# Patient Record
Sex: MALE | Race: WHITE | NOT HISPANIC OR LATINO | Employment: OTHER | URBAN - METROPOLITAN AREA
[De-identification: names, ages, dates, MRNs, and addresses within clinical notes are randomized per-mention and may not be internally consistent; named-entity substitution may affect disease eponyms.]

---

## 2024-09-23 ENCOUNTER — APPOINTMENT (OUTPATIENT)
Dept: RADIOLOGY | Facility: HOSPITAL | Age: 69
End: 2024-09-23
Payer: MEDICARE

## 2024-09-23 ENCOUNTER — ANESTHESIA EVENT (OUTPATIENT)
Dept: OPERATING ROOM | Facility: HOSPITAL | Age: 69
End: 2024-09-23
Payer: MEDICARE

## 2024-09-23 ENCOUNTER — APPOINTMENT (OUTPATIENT)
Dept: CARDIOLOGY | Facility: HOSPITAL | Age: 69
End: 2024-09-23
Payer: MEDICARE

## 2024-09-23 ENCOUNTER — ANESTHESIA (OUTPATIENT)
Dept: OPERATING ROOM | Facility: HOSPITAL | Age: 69
End: 2024-09-23
Payer: MEDICARE

## 2024-09-23 ENCOUNTER — HOSPITAL ENCOUNTER (INPATIENT)
Facility: HOSPITAL | Age: 69
LOS: 5 days | Discharge: NURSING, REHAB OR LONG-TERM CARE | End: 2024-09-28
Attending: EMERGENCY MEDICINE | Admitting: STUDENT IN AN ORGANIZED HEALTH CARE EDUCATION/TRAINING PROGRAM
Payer: MEDICARE

## 2024-09-23 DIAGNOSIS — Z79.4 CONTROLLED TYPE 2 DIABETES MELLITUS WITHOUT COMPLICATION, WITH LONG-TERM CURRENT USE OF INSULIN (MULTI): ICD-10-CM

## 2024-09-23 DIAGNOSIS — S82.301A CLOSED FRACTURE OF DISTAL END OF RIGHT TIBIA, UNSPECIFIED FRACTURE MORPHOLOGY, INITIAL ENCOUNTER: Primary | ICD-10-CM

## 2024-09-23 DIAGNOSIS — Z79.4 TYPE 2 DIABETES MELLITUS WITHOUT COMPLICATION, WITH LONG-TERM CURRENT USE OF INSULIN (MULTI): ICD-10-CM

## 2024-09-23 DIAGNOSIS — R73.9 HYPERGLYCEMIA: ICD-10-CM

## 2024-09-23 DIAGNOSIS — E11.9 CONTROLLED TYPE 2 DIABETES MELLITUS WITHOUT COMPLICATION, WITH LONG-TERM CURRENT USE OF INSULIN (MULTI): ICD-10-CM

## 2024-09-23 DIAGNOSIS — E11.9 TYPE 2 DIABETES MELLITUS WITHOUT COMPLICATION, WITH LONG-TERM CURRENT USE OF INSULIN (MULTI): ICD-10-CM

## 2024-09-23 DIAGNOSIS — E87.5 HYPERKALEMIA: ICD-10-CM

## 2024-09-23 LAB
ALBUMIN SERPL BCP-MCNC: 3.6 G/DL (ref 3.4–5)
ALP SERPL-CCNC: 105 U/L (ref 33–136)
ALT SERPL W P-5'-P-CCNC: 71 U/L (ref 10–52)
ANION GAP SERPL CALC-SCNC: 11 MMOL/L (ref 10–20)
AST SERPL W P-5'-P-CCNC: 38 U/L (ref 9–39)
BASOPHILS # BLD AUTO: 0.07 X10*3/UL (ref 0–0.1)
BASOPHILS NFR BLD AUTO: 1.1 %
BILIRUB SERPL-MCNC: 0.7 MG/DL (ref 0–1.2)
BUN SERPL-MCNC: 11 MG/DL (ref 6–23)
CALCIUM SERPL-MCNC: 9 MG/DL (ref 8.6–10.3)
CARDIAC TROPONIN I PNL SERPL HS: 5 NG/L (ref 0–20)
CHLORIDE SERPL-SCNC: 101 MMOL/L (ref 98–107)
CO2 SERPL-SCNC: 28 MMOL/L (ref 21–32)
CREAT SERPL-MCNC: 1.88 MG/DL (ref 0.5–1.3)
EGFRCR SERPLBLD CKD-EPI 2021: 39 ML/MIN/1.73M*2
EOSINOPHIL # BLD AUTO: 0.18 X10*3/UL (ref 0–0.7)
EOSINOPHIL NFR BLD AUTO: 2.8 %
ERYTHROCYTE [DISTWIDTH] IN BLOOD BY AUTOMATED COUNT: 12.2 % (ref 11.5–14.5)
GLUCOSE BLD MANUAL STRIP-MCNC: 237 MG/DL (ref 74–99)
GLUCOSE BLD MANUAL STRIP-MCNC: 237 MG/DL (ref 74–99)
GLUCOSE BLD MANUAL STRIP-MCNC: 242 MG/DL (ref 74–99)
GLUCOSE BLD MANUAL STRIP-MCNC: 247 MG/DL (ref 74–99)
GLUCOSE SERPL-MCNC: 458 MG/DL (ref 74–99)
HCT VFR BLD AUTO: 37.6 % (ref 41–52)
HGB BLD-MCNC: 12.7 G/DL (ref 13.5–17.5)
IMM GRANULOCYTES # BLD AUTO: 0.02 X10*3/UL (ref 0–0.7)
IMM GRANULOCYTES NFR BLD AUTO: 0.3 % (ref 0–0.9)
INR PPP: 1.1 (ref 0.9–1.1)
LYMPHOCYTES # BLD AUTO: 1.58 X10*3/UL (ref 1.2–4.8)
LYMPHOCYTES NFR BLD AUTO: 24.2 %
MAGNESIUM SERPL-MCNC: 1.81 MG/DL (ref 1.6–2.4)
MCH RBC QN AUTO: 30.5 PG (ref 26–34)
MCHC RBC AUTO-ENTMCNC: 33.8 G/DL (ref 32–36)
MCV RBC AUTO: 90 FL (ref 80–100)
MONOCYTES # BLD AUTO: 0.42 X10*3/UL (ref 0.1–1)
MONOCYTES NFR BLD AUTO: 6.4 %
NEUTROPHILS # BLD AUTO: 4.26 X10*3/UL (ref 1.2–7.7)
NEUTROPHILS NFR BLD AUTO: 65.2 %
NRBC BLD-RTO: 0 /100 WBCS (ref 0–0)
PLATELET # BLD AUTO: 136 X10*3/UL (ref 150–450)
POTASSIUM SERPL-SCNC: 4.6 MMOL/L (ref 3.5–5.3)
POTASSIUM SERPL-SCNC: 5.7 MMOL/L (ref 3.5–5.3)
PROT SERPL-MCNC: 6.3 G/DL (ref 6.4–8.2)
PROTHROMBIN TIME: 12.1 SECONDS (ref 9.8–12.8)
RBC # BLD AUTO: 4.16 X10*6/UL (ref 4.5–5.9)
SODIUM SERPL-SCNC: 134 MMOL/L (ref 136–145)
WBC # BLD AUTO: 6.5 X10*3/UL (ref 4.4–11.3)

## 2024-09-23 PROCEDURE — 99221 1ST HOSP IP/OBS SF/LOW 40: CPT | Performed by: ORTHOPAEDIC SURGERY

## 2024-09-23 PROCEDURE — 2500000001 HC RX 250 WO HCPCS SELF ADMINISTERED DRUGS (ALT 637 FOR MEDICARE OP): Performed by: REGISTERED NURSE

## 2024-09-23 PROCEDURE — 96375 TX/PRO/DX INJ NEW DRUG ADDON: CPT

## 2024-09-23 PROCEDURE — 80053 COMPREHEN METABOLIC PANEL: CPT | Performed by: NURSE PRACTITIONER

## 2024-09-23 PROCEDURE — 73600 X-RAY EXAM OF ANKLE: CPT | Mod: RIGHT SIDE | Performed by: RADIOLOGY

## 2024-09-23 PROCEDURE — 2500000002 HC RX 250 W HCPCS SELF ADMINISTERED DRUGS (ALT 637 FOR MEDICARE OP, ALT 636 FOR OP/ED): Performed by: REGISTERED NURSE

## 2024-09-23 PROCEDURE — 99222 1ST HOSP IP/OBS MODERATE 55: CPT | Performed by: REGISTERED NURSE

## 2024-09-23 PROCEDURE — 2500000004 HC RX 250 GENERAL PHARMACY W/ HCPCS (ALT 636 FOR OP/ED): Performed by: REGISTERED NURSE

## 2024-09-23 PROCEDURE — 2500000004 HC RX 250 GENERAL PHARMACY W/ HCPCS (ALT 636 FOR OP/ED): Performed by: NURSE PRACTITIONER

## 2024-09-23 PROCEDURE — 96361 HYDRATE IV INFUSION ADD-ON: CPT

## 2024-09-23 PROCEDURE — 73700 CT LOWER EXTREMITY W/O DYE: CPT | Mod: RIGHT SIDE | Performed by: RADIOLOGY

## 2024-09-23 PROCEDURE — 85025 COMPLETE CBC W/AUTO DIFF WBC: CPT | Performed by: NURSE PRACTITIONER

## 2024-09-23 PROCEDURE — 96374 THER/PROPH/DIAG INJ IV PUSH: CPT

## 2024-09-23 PROCEDURE — 84132 ASSAY OF SERUM POTASSIUM: CPT | Performed by: REGISTERED NURSE

## 2024-09-23 PROCEDURE — 73590 X-RAY EXAM OF LOWER LEG: CPT | Mod: RIGHT SIDE | Performed by: RADIOLOGY

## 2024-09-23 PROCEDURE — 73700 CT LOWER EXTREMITY W/O DYE: CPT | Mod: RT

## 2024-09-23 PROCEDURE — 73600 X-RAY EXAM OF ANKLE: CPT | Mod: RT

## 2024-09-23 PROCEDURE — 73590 X-RAY EXAM OF LOWER LEG: CPT | Mod: RT

## 2024-09-23 PROCEDURE — 85610 PROTHROMBIN TIME: CPT | Performed by: NURSE PRACTITIONER

## 2024-09-23 PROCEDURE — 2500000001 HC RX 250 WO HCPCS SELF ADMINISTERED DRUGS (ALT 637 FOR MEDICARE OP): Performed by: NURSE PRACTITIONER

## 2024-09-23 PROCEDURE — 93005 ELECTROCARDIOGRAM TRACING: CPT

## 2024-09-23 PROCEDURE — 99285 EMERGENCY DEPT VISIT HI MDM: CPT

## 2024-09-23 PROCEDURE — 83735 ASSAY OF MAGNESIUM: CPT | Performed by: NURSE PRACTITIONER

## 2024-09-23 PROCEDURE — 2500000002 HC RX 250 W HCPCS SELF ADMINISTERED DRUGS (ALT 637 FOR MEDICARE OP, ALT 636 FOR OP/ED): Performed by: NURSE PRACTITIONER

## 2024-09-23 PROCEDURE — 71045 X-RAY EXAM CHEST 1 VIEW: CPT

## 2024-09-23 PROCEDURE — 36415 COLL VENOUS BLD VENIPUNCTURE: CPT | Performed by: REGISTERED NURSE

## 2024-09-23 PROCEDURE — 84484 ASSAY OF TROPONIN QUANT: CPT | Performed by: NURSE PRACTITIONER

## 2024-09-23 PROCEDURE — 1200000002 HC GENERAL ROOM WITH TELEMETRY DAILY

## 2024-09-23 PROCEDURE — 36415 COLL VENOUS BLD VENIPUNCTURE: CPT | Performed by: NURSE PRACTITIONER

## 2024-09-23 PROCEDURE — 82947 ASSAY GLUCOSE BLOOD QUANT: CPT

## 2024-09-23 RX ORDER — MORPHINE SULFATE 4 MG/ML
4 INJECTION, SOLUTION INTRAMUSCULAR; INTRAVENOUS ONCE
Status: COMPLETED | OUTPATIENT
Start: 2024-09-23 | End: 2024-09-23

## 2024-09-23 RX ORDER — FLUTICASONE FUROATE AND VILANTEROL TRIFENATATE 200; 25 UG/1; UG/1
1 POWDER RESPIRATORY (INHALATION) DAILY
COMMUNITY
Start: 2024-05-30

## 2024-09-23 RX ORDER — INSULIN LISPRO 100 [IU]/ML
0-10 INJECTION, SOLUTION INTRAVENOUS; SUBCUTANEOUS
Status: DISCONTINUED | OUTPATIENT
Start: 2024-09-23 | End: 2024-09-28 | Stop reason: HOSPADM

## 2024-09-23 RX ORDER — ACETAMINOPHEN 160 MG/5ML
650 SOLUTION ORAL EVERY 4 HOURS PRN
Status: DISCONTINUED | OUTPATIENT
Start: 2024-09-23 | End: 2024-09-28 | Stop reason: HOSPADM

## 2024-09-23 RX ORDER — ZOLPIDEM TARTRATE 5 MG/1
10 TABLET ORAL NIGHTLY
Status: DISCONTINUED | OUTPATIENT
Start: 2024-09-23 | End: 2024-09-28 | Stop reason: HOSPADM

## 2024-09-23 RX ORDER — SODIUM BICARBONATE 650 MG/1
650 TABLET ORAL 2 TIMES DAILY
COMMUNITY
Start: 2024-05-01

## 2024-09-23 RX ORDER — ZOLPIDEM TARTRATE 12.5 MG/1
12.5 TABLET, FILM COATED, EXTENDED RELEASE ORAL NIGHTLY PRN
COMMUNITY
End: 2024-09-28 | Stop reason: HOSPADM

## 2024-09-23 RX ORDER — ATORVASTATIN CALCIUM 40 MG/1
40 TABLET, FILM COATED ORAL
COMMUNITY
Start: 2023-10-30

## 2024-09-23 RX ORDER — CYCLOBENZAPRINE HCL 5 MG
1 TABLET ORAL NIGHTLY PRN
COMMUNITY
Start: 2024-05-30 | End: 2024-09-28 | Stop reason: HOSPADM

## 2024-09-23 RX ORDER — MONTELUKAST SODIUM 10 MG/1
10 TABLET ORAL NIGHTLY
COMMUNITY

## 2024-09-23 RX ORDER — NALOXONE HYDROCHLORIDE 1 MG/ML
0.2 INJECTION INTRAMUSCULAR; INTRAVENOUS; SUBCUTANEOUS EVERY 5 MIN PRN
Status: DISCONTINUED | OUTPATIENT
Start: 2024-09-23 | End: 2024-09-28 | Stop reason: HOSPADM

## 2024-09-23 RX ORDER — LISINOPRIL 10 MG/1
10 TABLET ORAL DAILY
COMMUNITY
Start: 2024-03-01

## 2024-09-23 RX ORDER — ACETAMINOPHEN 650 MG/1
650 SUPPOSITORY RECTAL EVERY 4 HOURS PRN
Status: DISCONTINUED | OUTPATIENT
Start: 2024-09-23 | End: 2024-09-28 | Stop reason: HOSPADM

## 2024-09-23 RX ORDER — ACETAMINOPHEN 325 MG/1
650 TABLET ORAL EVERY 4 HOURS PRN
Status: DISCONTINUED | OUTPATIENT
Start: 2024-09-23 | End: 2024-09-28 | Stop reason: HOSPADM

## 2024-09-23 RX ORDER — TAMSULOSIN HYDROCHLORIDE 0.4 MG/1
0.4 CAPSULE ORAL
COMMUNITY
Start: 2024-07-02

## 2024-09-23 RX ORDER — ONDANSETRON HYDROCHLORIDE 2 MG/ML
4 INJECTION, SOLUTION INTRAVENOUS EVERY 8 HOURS PRN
Status: DISCONTINUED | OUTPATIENT
Start: 2024-09-23 | End: 2024-09-28 | Stop reason: HOSPADM

## 2024-09-23 RX ORDER — SODIUM CHLORIDE 9 MG/ML
50 INJECTION, SOLUTION INTRAVENOUS CONTINUOUS
Status: ACTIVE | OUTPATIENT
Start: 2024-09-23 | End: 2024-09-24

## 2024-09-23 RX ORDER — FENTANYL CITRATE 50 UG/ML
50 INJECTION, SOLUTION INTRAMUSCULAR; INTRAVENOUS ONCE
Status: COMPLETED | OUTPATIENT
Start: 2024-09-23 | End: 2024-09-23

## 2024-09-23 RX ORDER — TALC
3 POWDER (GRAM) TOPICAL NIGHTLY PRN
Status: DISCONTINUED | OUTPATIENT
Start: 2024-09-23 | End: 2024-09-28 | Stop reason: HOSPADM

## 2024-09-23 RX ORDER — MIRTAZAPINE 15 MG/1
15 TABLET, FILM COATED ORAL NIGHTLY
COMMUNITY

## 2024-09-23 RX ORDER — BUPROPION HYDROCHLORIDE 150 MG/1
150 TABLET, EXTENDED RELEASE ORAL 2 TIMES DAILY
COMMUNITY

## 2024-09-23 RX ORDER — POLYETHYLENE GLYCOL 3350 17 G/17G
17 POWDER, FOR SOLUTION ORAL DAILY
Status: DISCONTINUED | OUTPATIENT
Start: 2024-09-23 | End: 2024-09-28 | Stop reason: HOSPADM

## 2024-09-23 RX ORDER — CARVEDILOL 6.25 MG/1
6.25 TABLET ORAL 2 TIMES DAILY
Status: DISCONTINUED | OUTPATIENT
Start: 2024-09-23 | End: 2024-09-28 | Stop reason: HOSPADM

## 2024-09-23 RX ORDER — ONDANSETRON 4 MG/1
4 TABLET, FILM COATED ORAL EVERY 8 HOURS PRN
Status: DISCONTINUED | OUTPATIENT
Start: 2024-09-23 | End: 2024-09-28 | Stop reason: HOSPADM

## 2024-09-23 RX ORDER — MORPHINE SULFATE 2 MG/ML
1 INJECTION, SOLUTION INTRAMUSCULAR; INTRAVENOUS EVERY 4 HOURS PRN
Status: DISCONTINUED | OUTPATIENT
Start: 2024-09-23 | End: 2024-09-25

## 2024-09-23 RX ORDER — GABAPENTIN 300 MG/1
300 CAPSULE ORAL 2 TIMES DAILY
COMMUNITY

## 2024-09-23 RX ORDER — CARVEDILOL 6.25 MG/1
6.25 TABLET ORAL 2 TIMES DAILY
COMMUNITY
Start: 2024-01-03

## 2024-09-23 RX ORDER — VIT C/E/ZN/COPPR/LUTEIN/ZEAXAN 250MG-90MG
1000 CAPSULE ORAL
COMMUNITY
Start: 2023-01-11

## 2024-09-23 RX ORDER — LISINOPRIL 10 MG/1
10 TABLET ORAL DAILY
Status: DISCONTINUED | OUTPATIENT
Start: 2024-09-23 | End: 2024-09-26

## 2024-09-23 RX ORDER — HEPARIN SODIUM 5000 [USP'U]/ML
5000 INJECTION, SOLUTION INTRAVENOUS; SUBCUTANEOUS ONCE
Status: DISCONTINUED | OUTPATIENT
Start: 2024-09-23 | End: 2024-09-23

## 2024-09-23 RX ORDER — HEPARIN SODIUM 5000 [USP'U]/ML
5000 INJECTION, SOLUTION INTRAVENOUS; SUBCUTANEOUS EVERY 8 HOURS
Status: DISCONTINUED | OUTPATIENT
Start: 2024-09-23 | End: 2024-09-23

## 2024-09-23 RX ORDER — ONDANSETRON HYDROCHLORIDE 2 MG/ML
4 INJECTION, SOLUTION INTRAVENOUS ONCE
Status: COMPLETED | OUTPATIENT
Start: 2024-09-23 | End: 2024-09-23

## 2024-09-23 RX ORDER — METFORMIN HYDROCHLORIDE 500 MG/1
500 TABLET ORAL 2 TIMES DAILY
COMMUNITY

## 2024-09-23 RX ORDER — HYDROCODONE BITARTRATE AND ACETAMINOPHEN 5; 325 MG/1; MG/1
1 TABLET ORAL EVERY 6 HOURS PRN
Status: DISCONTINUED | OUTPATIENT
Start: 2024-09-23 | End: 2024-09-28 | Stop reason: HOSPADM

## 2024-09-23 RX ORDER — ENOXAPARIN SODIUM 100 MG/ML
30 INJECTION SUBCUTANEOUS ONCE
Status: COMPLETED | OUTPATIENT
Start: 2024-09-23 | End: 2024-09-23

## 2024-09-23 RX ORDER — INSULIN ASPART INJECTION 100 [IU]/ML
4 INJECTION, SOLUTION SUBCUTANEOUS
Status: ON HOLD | COMMUNITY
Start: 2024-08-07 | End: 2024-09-26

## 2024-09-23 RX ORDER — CYANOCOBALAMIN 1000 UG/ML
1000 INJECTION, SOLUTION INTRAMUSCULAR; SUBCUTANEOUS
COMMUNITY
Start: 2024-06-10

## 2024-09-23 SDOH — SOCIAL STABILITY: SOCIAL INSECURITY: HAS ANYONE EVER THREATENED TO HURT YOUR FAMILY OR YOUR PETS?: NO

## 2024-09-23 SDOH — ECONOMIC STABILITY: TRANSPORTATION INSECURITY
IN THE PAST 12 MONTHS, HAS THE LACK OF TRANSPORTATION KEPT YOU FROM MEDICAL APPOINTMENTS OR FROM GETTING MEDICATIONS?: PATIENT DECLINED

## 2024-09-23 SDOH — ECONOMIC STABILITY: HOUSING INSECURITY: AT ANY TIME IN THE PAST 12 MONTHS, WERE YOU HOMELESS OR LIVING IN A SHELTER (INCLUDING NOW)?: PATIENT DECLINED

## 2024-09-23 SDOH — SOCIAL STABILITY: SOCIAL INSECURITY: ARE YOU OR HAVE YOU BEEN THREATENED OR ABUSED PHYSICALLY, EMOTIONALLY, OR SEXUALLY BY ANYONE?: NO

## 2024-09-23 SDOH — SOCIAL STABILITY: SOCIAL INSECURITY: DO YOU FEEL UNSAFE GOING BACK TO THE PLACE WHERE YOU ARE LIVING?: NO

## 2024-09-23 SDOH — SOCIAL STABILITY: SOCIAL INSECURITY: ABUSE: ADULT

## 2024-09-23 SDOH — SOCIAL STABILITY: SOCIAL INSECURITY: WERE YOU ABLE TO COMPLETE ALL THE BEHAVIORAL HEALTH SCREENINGS?: YES

## 2024-09-23 SDOH — SOCIAL STABILITY: SOCIAL INSECURITY: DOES ANYONE TRY TO KEEP YOU FROM HAVING/CONTACTING OTHER FRIENDS OR DOING THINGS OUTSIDE YOUR HOME?: NO

## 2024-09-23 SDOH — ECONOMIC STABILITY: INCOME INSECURITY: HOW HARD IS IT FOR YOU TO PAY FOR THE VERY BASICS LIKE FOOD, HOUSING, MEDICAL CARE, AND HEATING?: PATIENT DECLINED

## 2024-09-23 SDOH — ECONOMIC STABILITY: INCOME INSECURITY: IN THE LAST 12 MONTHS, WAS THERE A TIME WHEN YOU WERE NOT ABLE TO PAY THE MORTGAGE OR RENT ON TIME?: PATIENT DECLINED

## 2024-09-23 SDOH — SOCIAL STABILITY: SOCIAL INSECURITY: HAVE YOU HAD ANY THOUGHTS OF HARMING ANYONE ELSE?: NO

## 2024-09-23 SDOH — SOCIAL STABILITY: SOCIAL INSECURITY: HAVE YOU HAD THOUGHTS OF HARMING ANYONE ELSE?: NO

## 2024-09-23 SDOH — ECONOMIC STABILITY: HOUSING INSECURITY: IN THE PAST 12 MONTHS, HOW MANY TIMES HAVE YOU MOVED WHERE YOU WERE LIVING?: 0

## 2024-09-23 SDOH — SOCIAL STABILITY: SOCIAL INSECURITY: ARE THERE ANY APPARENT SIGNS OF INJURIES/BEHAVIORS THAT COULD BE RELATED TO ABUSE/NEGLECT?: NO

## 2024-09-23 SDOH — SOCIAL STABILITY: SOCIAL INSECURITY: DO YOU FEEL ANYONE HAS EXPLOITED OR TAKEN ADVANTAGE OF YOU FINANCIALLY OR OF YOUR PERSONAL PROPERTY?: NO

## 2024-09-23 SDOH — ECONOMIC STABILITY: TRANSPORTATION INSECURITY
IN THE PAST 12 MONTHS, HAS LACK OF TRANSPORTATION KEPT YOU FROM MEETINGS, WORK, OR FROM GETTING THINGS NEEDED FOR DAILY LIVING?: PATIENT DECLINED

## 2024-09-23 ASSESSMENT — COGNITIVE AND FUNCTIONAL STATUS - GENERAL
TURNING FROM BACK TO SIDE WHILE IN FLAT BAD: A LITTLE
PATIENT BASELINE BEDBOUND: NO
TOILETING: A LITTLE
CLIMB 3 TO 5 STEPS WITH RAILING: A LOT
MOVING TO AND FROM BED TO CHAIR: A LOT
MOBILITY SCORE: 14
MOVING TO AND FROM BED TO CHAIR: A LOT
PERSONAL GROOMING: A LITTLE
DRESSING REGULAR UPPER BODY CLOTHING: A LITTLE
TOILETING: A LOT
MOVING FROM LYING ON BACK TO SITTING ON SIDE OF FLAT BED WITH BEDRAILS: A LITTLE
HELP NEEDED FOR BATHING: A LITTLE
DAILY ACTIVITIY SCORE: 21
CLIMB 3 TO 5 STEPS WITH RAILING: A LOT
STANDING UP FROM CHAIR USING ARMS: A LOT
TURNING FROM BACK TO SIDE WHILE IN FLAT BAD: A LITTLE
STANDING UP FROM CHAIR USING ARMS: A LOT
HELP NEEDED FOR BATHING: A LITTLE
DRESSING REGULAR LOWER BODY CLOTHING: A LOT
WALKING IN HOSPITAL ROOM: A LOT
MOVING FROM LYING ON BACK TO SITTING ON SIDE OF FLAT BED WITH BEDRAILS: A LITTLE
WALKING IN HOSPITAL ROOM: A LOT
MOBILITY SCORE: 14
DRESSING REGULAR LOWER BODY CLOTHING: A LITTLE
DAILY ACTIVITIY SCORE: 17

## 2024-09-23 ASSESSMENT — CHA2DS2 SCORE
DIABETES: YES
CHA2D2S VASC SCORE: 3
AGE IN YEARS: 65-74
SEX: MALE
HYPERTENSION: YES
CHF OR LEFT VENTRICULAR DYSFUNCTION: NO
PRIOR STROKE OR TIA OR THROMBOEMBOLISM: NO
VASCULAR DISEASE: NO

## 2024-09-23 ASSESSMENT — PAIN - FUNCTIONAL ASSESSMENT
PAIN_FUNCTIONAL_ASSESSMENT: 0-10

## 2024-09-23 ASSESSMENT — PATIENT HEALTH QUESTIONNAIRE - PHQ9
1. LITTLE INTEREST OR PLEASURE IN DOING THINGS: NOT AT ALL
2. FEELING DOWN, DEPRESSED OR HOPELESS: NOT AT ALL
SUM OF ALL RESPONSES TO PHQ9 QUESTIONS 1 & 2: 0

## 2024-09-23 ASSESSMENT — ACTIVITIES OF DAILY LIVING (ADL)
DRESSING YOURSELF: INDEPENDENT
TOILETING: INDEPENDENT
WALKS IN HOME: INDEPENDENT
HEARING - RIGHT EAR: FUNCTIONAL
JUDGMENT_ADEQUATE_SAFELY_COMPLETE_DAILY_ACTIVITIES: YES
BATHING: INDEPENDENT
ADEQUATE_TO_COMPLETE_ADL: YES
GROOMING: INDEPENDENT
PATIENT'S MEMORY ADEQUATE TO SAFELY COMPLETE DAILY ACTIVITIES?: YES
HEARING - LEFT EAR: FUNCTIONAL
FEEDING YOURSELF: INDEPENDENT

## 2024-09-23 ASSESSMENT — PAIN SCALES - GENERAL
PAINLEVEL_OUTOF10: 8
PAINLEVEL_OUTOF10: 6
PAINLEVEL_OUTOF10: 9
PAINLEVEL_OUTOF10: 8
PAINLEVEL_OUTOF10: 7
PAINLEVEL_OUTOF10: 6
PAINLEVEL_OUTOF10: 8

## 2024-09-23 ASSESSMENT — COLUMBIA-SUICIDE SEVERITY RATING SCALE - C-SSRS
6. HAVE YOU EVER DONE ANYTHING, STARTED TO DO ANYTHING, OR PREPARED TO DO ANYTHING TO END YOUR LIFE?: NO
2. HAVE YOU ACTUALLY HAD ANY THOUGHTS OF KILLING YOURSELF?: NO
1. IN THE PAST MONTH, HAVE YOU WISHED YOU WERE DEAD OR WISHED YOU COULD GO TO SLEEP AND NOT WAKE UP?: NO

## 2024-09-23 ASSESSMENT — LIFESTYLE VARIABLES
AUDIT-C TOTAL SCORE: 0
TOTAL SCORE: 0
HOW MANY STANDARD DRINKS CONTAINING ALCOHOL DO YOU HAVE ON A TYPICAL DAY: PATIENT DOES NOT DRINK
HAVE PEOPLE ANNOYED YOU BY CRITICIZING YOUR DRINKING: NO
EVER FELT BAD OR GUILTY ABOUT YOUR DRINKING: NO
EVER HAD A DRINK FIRST THING IN THE MORNING TO STEADY YOUR NERVES TO GET RID OF A HANGOVER: NO
AUDIT-C TOTAL SCORE: 0
HOW OFTEN DO YOU HAVE 6 OR MORE DRINKS ON ONE OCCASION: NEVER
HAVE YOU EVER FELT YOU SHOULD CUT DOWN ON YOUR DRINKING: NO
SKIP TO QUESTIONS 9-10: 1
HOW OFTEN DO YOU HAVE A DRINK CONTAINING ALCOHOL: NEVER

## 2024-09-23 ASSESSMENT — PAIN DESCRIPTION - DESCRIPTORS: DESCRIPTORS: ACHING

## 2024-09-23 ASSESSMENT — PAIN DESCRIPTION - ORIENTATION: ORIENTATION: RIGHT

## 2024-09-23 ASSESSMENT — PAIN DESCRIPTION - LOCATION: LOCATION: ANKLE

## 2024-09-23 NOTE — H&P
History Of Present Illness  Jorge Barrera is a 68 y.o. male presenting with complaint of right ankle pain s/p mechanical fall.  Patient stated he was carrying water down his wheelchair ramp which was wet outside after rain fall, slipped, and could not catch himself.  He has a history of Parkinson's disease with bilateral lower extremity weakness.  He did not hit his head and he did not lose consciousness.  He is independent with all ADLs and uses a scooter in most instances.  He denies any significant cardiac history such as PCI, CABG.  PMH: Parkinson's disease, neuropathy, type 2 diabetes insulin-dependent, Hashimoto's, hep C, COPD, hypertension, hyperlipidemia, CKD stage IIIb, BPH, anxiety, dysphagia.    Patient denies any precipitating factors such as loss of consciousness, dizziness, blurry vision, headache, chest pain, shortness of breath, fever or chills prior to or after mechanical fall.  X-ray showed right fibula fracture and right tibia fracture.  Orthopedics recommends CT scan.    He does take a daily aspirin.  Admission labs show glucose 458, sodium 134, potassium 5.7, creatinine 1.88, EGFR 39, BUN 11, INR 1.1, WBC 6.5, hemoglobin 12.7, hematocrit 37.6, platelets 136.    Patient examined and seen. Alert and oriented x3, resting comfortably.  Patient denies chest pain, shortness of breath, palpitations, abdominal pain, fever or chills.  Right lower extremity is splinted with toes warm and pink, capillary refills < 2. Pt will be admitted for further intervention. Orthopedics consulted.     Review of systems: 10 system were reviewed and were negative except what was mentioned in history of present illness    RCRI indicator  II cardiac event calculated at this time with available imaging and history.  Telemetry monitoring recommended postop.  Patient in usual state of mental health, alert and oriented x4, discussed risks, benefits, alternative treatments with patient.  Patient verbalized understanding through  teach back method and is agreeable for surgical intervention.  Risks and benefits will also be discussed  with patient at bedside per orthopedic team. Patient is currently medically stable at this time, procedure is medically necessary despite comorbidites, patient is an acceptable risk for surgery MODERATE RISK.    Surgical Risk Assessment:  Updated Revised Cardiac Risk Index (RCRI):  High risk surgery: 0  Ischemic heart disease history: 0  CHF history: 0  TIA/CVA history: 0  Pre-operative treatement with insulin: 1  Pre-op creatinine>2.0: 0    1 point = Class II Risk/6.0% 30 day risk of both intra-/post-operative major adverse cardiovascular events including cardiac arrest, MI, arrhythmias, and/or even death.  Patient appears medically optimized.    Re-assess patient post-operatively for hypotension, aspiration, altered mental status, or any other change in status from baseline for possible escalation of monitoring in telemetry or ICU management.  Post-operative VTE prophylaxis and pain management per surgical service.    VTE prophylaxis: SCDs (resume chemoprophylaxis per surgery service)       Past Medical History  Neuropathy, Type 2 Diabetes insulin dependent, Hep C, COPD, HTN, HLD, BPH, CKD statge 3b, anxiety, dysphagia     Surgical History  He has no past surgical history on file.     Social History  Denies drug, alcohol or tobacco use     Family History  Reviewed, not pertinent to admission      Allergies  Patient has no known allergies.         Physical Exam   Constitutional: Well developed, awake/alert/oriented x3, , cooperative + hx of dysphagia  Eyes: PERRL, EOMI, clear sclera  ENMT: mucous membranes moist, no apparent injury, no lesions seen - no dentures in place   Head/Neck: Neck supple, no apparent injury, thyroid without mass or tenderness  Respiratory/Thorax: Patent airways,  normal breath sounds with good chest expansion, thorax symmetric  Cardiovascular: Regular, rate and rhythm, no murmurs, 2+  equal pulses of the extremities, normal S 1and S 2  Gastrointestinal: Nondistended, soft, non-tender,  +BS x 4 quadrants  Genitourinary: voiding freely without CVA tenderness or suprabupic tenderness   Musculoskeletal:  Right lower extremity in splint, toes are warm and pink, capillary refills < 2   Extremities: normal extremities,  no contusions or wounds seen  except as noted above   Skin: warm, dry, intact  Neurological: alert/oriented x 3, speech clear,   Psychiatric: appropriate mood and behavior    Last Recorded Vitals  /89   Pulse 75   Temp 35.9 °C (96.6 °F) (Temporal)   Resp 16   Wt 72.6 kg (160 lb)   SpO2 98%     Relevant Results  Scheduled medications  sodium chloride, 500 mL, intravenous, Once      Continuous medications     PRN medications      Results for orders placed or performed during the hospital encounter of 09/23/24 (from the past 24 hour(s))   ECG 12 lead   Result Value Ref Range    Ventricular Rate 69 BPM    Atrial Rate 69 BPM    AZ Interval 162 ms    QRS Duration 76 ms    QT Interval 398 ms    QTC Calculation(Bazett) 426 ms    P Axis 64 degrees    R Axis 79 degrees    T Axis 59 degrees    QRS Count 11 beats    Q Onset 225 ms    P Onset 144 ms    P Offset 207 ms    T Offset 424 ms    QTC Fredericia 417 ms   CBC and Auto Differential   Result Value Ref Range    WBC 6.5 4.4 - 11.3 x10*3/uL    nRBC 0.0 0.0 - 0.0 /100 WBCs    RBC 4.16 (L) 4.50 - 5.90 x10*6/uL    Hemoglobin 12.7 (L) 13.5 - 17.5 g/dL    Hematocrit 37.6 (L) 41.0 - 52.0 %    MCV 90 80 - 100 fL    MCH 30.5 26.0 - 34.0 pg    MCHC 33.8 32.0 - 36.0 g/dL    RDW 12.2 11.5 - 14.5 %    Platelets 136 (L) 150 - 450 x10*3/uL    Neutrophils % 65.2 40.0 - 80.0 %    Immature Granulocytes %, Automated 0.3 0.0 - 0.9 %    Lymphocytes % 24.2 13.0 - 44.0 %    Monocytes % 6.4 2.0 - 10.0 %    Eosinophils % 2.8 0.0 - 6.0 %    Basophils % 1.1 0.0 - 2.0 %    Neutrophils Absolute 4.26 1.20 - 7.70 x10*3/uL    Immature Granulocytes Absolute, Automated  0.02 0.00 - 0.70 x10*3/uL    Lymphocytes Absolute 1.58 1.20 - 4.80 x10*3/uL    Monocytes Absolute 0.42 0.10 - 1.00 x10*3/uL    Eosinophils Absolute 0.18 0.00 - 0.70 x10*3/uL    Basophils Absolute 0.07 0.00 - 0.10 x10*3/uL   Comprehensive metabolic panel   Result Value Ref Range    Glucose 458 (HH) 74 - 99 mg/dL    Sodium 134 (L) 136 - 145 mmol/L    Potassium 5.7 (H) 3.5 - 5.3 mmol/L    Chloride 101 98 - 107 mmol/L    Bicarbonate 28 21 - 32 mmol/L    Anion Gap 11 10 - 20 mmol/L    Urea Nitrogen 11 6 - 23 mg/dL    Creatinine 1.88 (H) 0.50 - 1.30 mg/dL    eGFR 39 (L) >60 mL/min/1.73m*2    Calcium 9.0 8.6 - 10.3 mg/dL    Albumin 3.6 3.4 - 5.0 g/dL    Alkaline Phosphatase 105 33 - 136 U/L    Total Protein 6.3 (L) 6.4 - 8.2 g/dL    AST 38 9 - 39 U/L    Bilirubin, Total 0.7 0.0 - 1.2 mg/dL    ALT 71 (H) 10 - 52 U/L   Magnesium   Result Value Ref Range    Magnesium 1.81 1.60 - 2.40 mg/dL   Protime-INR   Result Value Ref Range    Protime 12.1 9.8 - 12.8 seconds    INR 1.1 0.9 - 1.1   Troponin I, High Sensitivity   Result Value Ref Range    Troponin I, High Sensitivity 5 0 - 20 ng/L     Assessment/Plan   Assessment & Plan      # Oblique fracture of the distal shaft of the right tibia.   Displaced fracture of the proximal right fibula.     Admit to Hospital Team   Orthopedics  team Consulted for surgery in AM   DVTp and Pain management per Ortho   PT/OT evaluation  and treatment   CBC/BMP as appropriate, review results  Pulmonary Toileting   Follow up as needed outpatient with Orthopedics at discharge       # Diabetes Mellitus Type 2 Insulin Dependent   Continue home medications  Diet Cardiac/Diabetic  Continue Sliding Scale SSI AC/HS   A1C 608 from 8/2024  Encourage healthy lifestyle changes  Home meds not reconciled    # HTN/ HLD  Hemodynamically stable  Pending home med reconciliation     # BPH  Continue home mediations when verified    # CKD Stage 3b  Avoid nephrotoxic agents  EMR indicates baseline 1.95     #  Hyperkalemia  Repeat potassium 5.7 on admission       FULL CODE  Disposition: 48-72 hours   Possible Rehab Placement will be needed    Time spent  56 minutes obtaining labs, imaging, recommendations, interview, assessment, examination, medication review/ordering, and EMR review.    Plan of care was discussed extensively with patient, RN and Primary attending Dr. Celaya. Patient verbalized understanding through teach back method. All questions and concerns addressed upon examination.     Of note, this documentation is completed using the Dragon Dictation system (voice recognition software). There may be spelling and/or grammatical errors that were not corrected prior to final submission.       Mery Dyer, APRN-CNP

## 2024-09-23 NOTE — CONSULTS
"Reason For Consult  Distal tibial fracture and fibular fracture    History Of Present Illness  Jorge Barrera is a 68 y.o. male presenting with right leg pain.  The patient was walking down his wheelchair ramp when he slipped and fell, injuring his right leg and ankle.  He denies any hip or knee pain.  He denies any groin pain on the right.  He denies any upper extremity pain.     Past Medical History  He has a past medical history of Diabetes mellitus (Multi) and Hypertension.    Surgical History  He has no past surgical history on file.     Social History  He reports that he has quit smoking. His smoking use included cigarettes. He has never used smokeless tobacco. He reports that he does not currently use alcohol. He reports that he does not use drugs.    Family History  No family history on file.     Allergies  Patient has no known allergies.    Review of Systems  As per HPI     Physical Exam  This is an older male in no acute distress     Last Recorded Vitals  Blood pressure (!) 182/89, pulse 75, temperature 35.9 °C (96.6 °F), resp. rate 20, height 1.702 m (5' 7\"), weight 72.6 kg (160 lb), SpO2 96%.  Bilateral shoulder elbow and wrist motion was painless.  Neck is supple and nontender  Logrolling of the hips elicits no pain  Bilateral palpation about the knees elicits no pain.  Right knee motion was painless.  The right leg is in a splint.  The patient had no increased pain with passive motion of his toes.  He was able to move his toes.  Dorsalis pedis pulse is 2+ and palpable on the right.  Relevant Results  Results for orders placed or performed during the hospital encounter of 09/23/24 (from the past 24 hour(s))   ECG 12 lead   Result Value Ref Range    Ventricular Rate 69 BPM    Atrial Rate 69 BPM    AL Interval 162 ms    QRS Duration 76 ms    QT Interval 398 ms    QTC Calculation(Bazett) 426 ms    P Axis 64 degrees    R Axis 79 degrees    T Axis 59 degrees    QRS Count 11 beats    Q Onset 225 ms    P Onset " 144 ms    P Offset 207 ms    T Offset 424 ms    QTC Fredericia 417 ms   CBC and Auto Differential   Result Value Ref Range    WBC 6.5 4.4 - 11.3 x10*3/uL    nRBC 0.0 0.0 - 0.0 /100 WBCs    RBC 4.16 (L) 4.50 - 5.90 x10*6/uL    Hemoglobin 12.7 (L) 13.5 - 17.5 g/dL    Hematocrit 37.6 (L) 41.0 - 52.0 %    MCV 90 80 - 100 fL    MCH 30.5 26.0 - 34.0 pg    MCHC 33.8 32.0 - 36.0 g/dL    RDW 12.2 11.5 - 14.5 %    Platelets 136 (L) 150 - 450 x10*3/uL    Neutrophils % 65.2 40.0 - 80.0 %    Immature Granulocytes %, Automated 0.3 0.0 - 0.9 %    Lymphocytes % 24.2 13.0 - 44.0 %    Monocytes % 6.4 2.0 - 10.0 %    Eosinophils % 2.8 0.0 - 6.0 %    Basophils % 1.1 0.0 - 2.0 %    Neutrophils Absolute 4.26 1.20 - 7.70 x10*3/uL    Immature Granulocytes Absolute, Automated 0.02 0.00 - 0.70 x10*3/uL    Lymphocytes Absolute 1.58 1.20 - 4.80 x10*3/uL    Monocytes Absolute 0.42 0.10 - 1.00 x10*3/uL    Eosinophils Absolute 0.18 0.00 - 0.70 x10*3/uL    Basophils Absolute 0.07 0.00 - 0.10 x10*3/uL   Comprehensive metabolic panel   Result Value Ref Range    Glucose 458 (HH) 74 - 99 mg/dL    Sodium 134 (L) 136 - 145 mmol/L    Potassium 5.7 (H) 3.5 - 5.3 mmol/L    Chloride 101 98 - 107 mmol/L    Bicarbonate 28 21 - 32 mmol/L    Anion Gap 11 10 - 20 mmol/L    Urea Nitrogen 11 6 - 23 mg/dL    Creatinine 1.88 (H) 0.50 - 1.30 mg/dL    eGFR 39 (L) >60 mL/min/1.73m*2    Calcium 9.0 8.6 - 10.3 mg/dL    Albumin 3.6 3.4 - 5.0 g/dL    Alkaline Phosphatase 105 33 - 136 U/L    Total Protein 6.3 (L) 6.4 - 8.2 g/dL    AST 38 9 - 39 U/L    Bilirubin, Total 0.7 0.0 - 1.2 mg/dL    ALT 71 (H) 10 - 52 U/L   Magnesium   Result Value Ref Range    Magnesium 1.81 1.60 - 2.40 mg/dL   Protime-INR   Result Value Ref Range    Protime 12.1 9.8 - 12.8 seconds    INR 1.1 0.9 - 1.1   Troponin I, High Sensitivity   Result Value Ref Range    Troponin I, High Sensitivity 5 0 - 20 ng/L   POCT GLUCOSE   Result Value Ref Range    POCT Glucose 247 (H) 74 - 99 mg/dL   POCT GLUCOSE    Result Value Ref Range    POCT Glucose 237 (H) 74 - 99 mg/dL   Potassium   Result Value Ref Range    Potassium 4.6 3.5 - 5.3 mmol/L   POCT GLUCOSE   Result Value Ref Range    POCT Glucose 242 (H) 74 - 99 mg/dL   Imaging:  XR tibia fibula right 2 views  Status: Final result     PACS Images - [id number]     Show images for XR tibia fibula right 2 views  Signed by    Signed Time Phone Pager   Korey Daniels MD 9/23/2024 12:51 [phone]      Exam Information    Status Exam Begun Exam Ended   Final 9/23/2024 11:53 9/23/2024 11:54     Study Result    Narrative & Impression   STUDY:  Tibia and Fibula Radiographs; 9/23/2024 at 11:54 AM.  INDICATION:  Evaluate for remainder of the tibia.  COMPARISON:  XR right ankle 9/23/2024.  ACCESSION NUMBER(S):  [id number]  ORDERING CLINICIAN:  ANNI CALABRESE  TECHNIQUE:  Two view(s) of the right tibia and fibula.  FINDINGS:    There is an oblique fracture of the distal shaft of the right tibia.   There is a displaced fracture of the proximal right fibula.  Soft  tissue swelling is noted.  IMPRESSION:  1.  Oblique fracture of the distal shaft of the right tibia.  2.  Displaced fracture of the proximal right fibula.  Signed by Korey Daniels MD     Narrative & Impression   STUDY:  CT Extremity; Completed Time:  9/23/2024 12:10 PM  INDICATION:  Evaluate fracture for extension into the joint.  COMPARISON:  XR ankle 9/23/2024.  ACCESSION NUMBER(S):  [id number]  ORDERING CLINICIAN:  ANNI CALABRESE  TECHNIQUE:  Thin section axial images were obtained through the right  ankle without intravenous contrast.  Orthogonal reconstructed images  were obtained and reviewed.    Automated mA/kV exposure control was utilized and patient examination  was performed in strict accordance with principles of ALARA.  FINDINGS: Diffuse demineralization. The nondisplaced spiral fracture  of the distal tibia seen on radiographs is not fully included on  imaging. Very subtle spiral extension into the junction  between the  posterior and middle thirds of the tibial plafond laterally is seen,  slice 22 of series 206. Mild involvement of the posteromedial aspect  of the tibial plafond, slice 38 series 202. There is also involvement  of the anterolateral tibial plafond with minimal anterior displacement  slice 40 of series 202. Please also refer to slice 37 of series 204.   Also noted is fracture of the posterior lateral aspect of the talus  slice 42 of series 202 associated with subchondral lucency. This  fracture is closely adjacent to a partially detached os trigonum. Tiny  acute avulsion fracture of the anterior margin of the lateral  malleolus is suspected. There is also mild chronic deformity of the  lateral malleolus.  Mild osteoarthritis of the tibiotalar, subtalar, talonavicular and  calcaneocuboid joint midfoot and tarsometatarsal joints. Corticated  accessory navicular.  Tendons and ligaments are not targeted for full evaluation.  Constellation of abnormalities would be consistent with syndesmotic  ligament injury, noting proximal fibular fracture in keeping with  Maissoneuve  injury pattern. Mild medial flexor and peroneal tendinosis is  appreciated.  Additional lateral ligamentous injury is questioned as there is  possible tiny anterior lateral malleolar fracture. The anterior  extensor tendons appear grossly intact. Probable Achilles tendinosis  and central cord predominant plantar fasciitis. Sinus tarsi is intact.  Incidental partial fusion between the navicular and the cuboid perhaps  related to previous trauma. Please refer to sagittal slice 23.  Extensive atherosclerosis.  IMPRESSION:  Spiral distal tibial diaphyseal fracture does extend into the tibial  plafond, with a mildly comminuted pattern. There is dominant displaced  transverse component at the junction between the middle and posterior  thirds of the tibial plafond, laterally more conspicuously than  medially, and there is also extension through the  anterolateral aspect  of the tibial plafond. This pattern of injury would be associated with  syndesmotic injury, both anterior and posterior suspected, more severe  anteriorly.  Tiny avulsion fracture of the tip of the lateral malleolus with  suspected associated lateral ligamentous injury.  Nondisplaced fracture of the posterior lateral talar dome associated  with subchondral lucency.   Signed by Damari Trujillo MD       Scheduled medications  insulin lispro, 0-10 Units, subcutaneous, Before meals & nightly  polyethylene glycol, 17 g, oral, Daily      Continuous medications  sodium chloride 0.9%, 50 mL/hr, Last Rate: 50 mL/hr (09/23/24 1603)      PRN medications  PRN medications: acetaminophen **OR** acetaminophen **OR** acetaminophen, HYDROcodone-acetaminophen, melatonin, morphine, naloxone, ondansetron **OR** ondansetron  Results for orders placed or performed during the hospital encounter of 09/23/24 (from the past 24 hour(s))   ECG 12 lead   Result Value Ref Range    Ventricular Rate 69 BPM    Atrial Rate 69 BPM    OH Interval 162 ms    QRS Duration 76 ms    QT Interval 398 ms    QTC Calculation(Bazett) 426 ms    P Axis 64 degrees    R Axis 79 degrees    T Axis 59 degrees    QRS Count 11 beats    Q Onset 225 ms    P Onset 144 ms    P Offset 207 ms    T Offset 424 ms    QTC Fredericia 417 ms   CBC and Auto Differential   Result Value Ref Range    WBC 6.5 4.4 - 11.3 x10*3/uL    nRBC 0.0 0.0 - 0.0 /100 WBCs    RBC 4.16 (L) 4.50 - 5.90 x10*6/uL    Hemoglobin 12.7 (L) 13.5 - 17.5 g/dL    Hematocrit 37.6 (L) 41.0 - 52.0 %    MCV 90 80 - 100 fL    MCH 30.5 26.0 - 34.0 pg    MCHC 33.8 32.0 - 36.0 g/dL    RDW 12.2 11.5 - 14.5 %    Platelets 136 (L) 150 - 450 x10*3/uL    Neutrophils % 65.2 40.0 - 80.0 %    Immature Granulocytes %, Automated 0.3 0.0 - 0.9 %    Lymphocytes % 24.2 13.0 - 44.0 %    Monocytes % 6.4 2.0 - 10.0 %    Eosinophils % 2.8 0.0 - 6.0 %    Basophils % 1.1 0.0 - 2.0 %    Neutrophils Absolute 4.26 1.20 -  7.70 x10*3/uL    Immature Granulocytes Absolute, Automated 0.02 0.00 - 0.70 x10*3/uL    Lymphocytes Absolute 1.58 1.20 - 4.80 x10*3/uL    Monocytes Absolute 0.42 0.10 - 1.00 x10*3/uL    Eosinophils Absolute 0.18 0.00 - 0.70 x10*3/uL    Basophils Absolute 0.07 0.00 - 0.10 x10*3/uL   Comprehensive metabolic panel   Result Value Ref Range    Glucose 458 (HH) 74 - 99 mg/dL    Sodium 134 (L) 136 - 145 mmol/L    Potassium 5.7 (H) 3.5 - 5.3 mmol/L    Chloride 101 98 - 107 mmol/L    Bicarbonate 28 21 - 32 mmol/L    Anion Gap 11 10 - 20 mmol/L    Urea Nitrogen 11 6 - 23 mg/dL    Creatinine 1.88 (H) 0.50 - 1.30 mg/dL    eGFR 39 (L) >60 mL/min/1.73m*2    Calcium 9.0 8.6 - 10.3 mg/dL    Albumin 3.6 3.4 - 5.0 g/dL    Alkaline Phosphatase 105 33 - 136 U/L    Total Protein 6.3 (L) 6.4 - 8.2 g/dL    AST 38 9 - 39 U/L    Bilirubin, Total 0.7 0.0 - 1.2 mg/dL    ALT 71 (H) 10 - 52 U/L   Magnesium   Result Value Ref Range    Magnesium 1.81 1.60 - 2.40 mg/dL   Protime-INR   Result Value Ref Range    Protime 12.1 9.8 - 12.8 seconds    INR 1.1 0.9 - 1.1   Troponin I, High Sensitivity   Result Value Ref Range    Troponin I, High Sensitivity 5 0 - 20 ng/L   POCT GLUCOSE   Result Value Ref Range    POCT Glucose 247 (H) 74 - 99 mg/dL   POCT GLUCOSE   Result Value Ref Range    POCT Glucose 237 (H) 74 - 99 mg/dL   Potassium   Result Value Ref Range    Potassium 4.6 3.5 - 5.3 mmol/L   POCT GLUCOSE   Result Value Ref Range    POCT Glucose 242 (H) 74 - 99 mg/dL        Assessment/Plan     Right distal tibial shaft fracture, per my interpretation of the CT scan I do not believe that the plafond and is involved.  Fracture should be amenable to an intramedullary nail.  Right fibular fracture    Plan intramedullary nailing of the right tibia pending medical clearance.  The procedure was explained to the patient  Questions answered      Sukhjinder Caberra MD

## 2024-09-23 NOTE — ED PROVIDER NOTES
HPI   Chief Complaint   Patient presents with    Ankle Pain       68-year-old male presents urgency department, states he was carrying a jug of water earlier this morning, the fan was on, think he might of gotten soft wrapped up in the Fan, describes falling injuring his right ankle.  Denies any additional injuries, denies head injury or loss of consciousness, neck or back pain.  Patient is on any blood thinners.  Describes isolated pain to the right ankle, no pain to the knee, hip.      History provided by:  Patient   used: No            Patient History   No past medical history on file.  No past surgical history on file.  No family history on file.  Social History     Tobacco Use    Smoking status: Not on file    Smokeless tobacco: Not on file   Substance Use Topics    Alcohol use: Not on file    Drug use: Not on file       Physical Exam   ED Triage Vitals [09/23/24 1014]   Temperature Heart Rate Respirations BP   35.9 °C (96.6 °F) 84 16 (!) 182/97      Pulse Ox Temp Source Heart Rate Source Patient Position   97 % Temporal -- --      BP Location FiO2 (%)     -- --       Physical Exam  Gen.: Vitals noted. No distress. Afebrile.   Neck: Supple. No adenopathy.   Cardiac: Regular rate rhythm. No murmur.   Pulmonary: Equal breath sounds bilaterally. No adventitious breath sounds.   Abdomen: Soft, nontender, nonsurgical. Normoactive bowel sounds.   Back: Nontender throughout.   Lower extremity: There is tenderness both to the lateral proximal ankle with some superficial abrasions presents  The foot is nontender. Is neurovascularly intact distally. The remainder of the extremity is nontender, specifically, nontender over the knee and fibular head.       ED Course & MDM   Diagnoses as of 09/23/24 1252   Closed fracture of distal end of right tibia, unspecified fracture morphology, initial encounter   Hyperglycemia   Hyperkalemia     Labs Reviewed   CBC WITH AUTO DIFFERENTIAL - Abnormal       Result  Value    WBC 6.5      nRBC 0.0      RBC 4.16 (*)     Hemoglobin 12.7 (*)     Hematocrit 37.6 (*)     MCV 90      MCH 30.5      MCHC 33.8      RDW 12.2      Platelets 136 (*)     Neutrophils % 65.2      Immature Granulocytes %, Automated 0.3      Lymphocytes % 24.2      Monocytes % 6.4      Eosinophils % 2.8      Basophils % 1.1      Neutrophils Absolute 4.26      Immature Granulocytes Absolute, Automated 0.02      Lymphocytes Absolute 1.58      Monocytes Absolute 0.42      Eosinophils Absolute 0.18      Basophils Absolute 0.07     COMPREHENSIVE METABOLIC PANEL - Abnormal    Glucose 458 (*)     Sodium 134 (*)     Potassium 5.7 (*)     Chloride 101      Bicarbonate 28      Anion Gap 11      Urea Nitrogen 11      Creatinine 1.88 (*)     eGFR 39 (*)     Calcium 9.0      Albumin 3.6      Alkaline Phosphatase 105      Total Protein 6.3 (*)     AST 38      Bilirubin, Total 0.7      ALT 71 (*)    MAGNESIUM - Normal    Magnesium 1.81     PROTIME-INR - Normal    Protime 12.1      INR 1.1     TROPONIN I, HIGH SENSITIVITY - Normal    Troponin I, High Sensitivity 5      Narrative:     Less than 99th percentile of normal range cutoff-  Female and children under 18 years old <14 ng/L; Male <21 ng/L: Negative  Repeat testing should be performed if clinically indicated.     Female and children under 18 years old 14-50 ng/L; Male 21-50 ng/L:  Consistent with possible cardiac damage and possible increased clinical   risk. Serial measurements may help to assess extent of myocardial damage.     >50 ng/L: Consistent with cardiac damage, increased clinical risk and  myocardial infarction. Serial measurements may help assess extent of   myocardial damage.      NOTE: Children less than 1 year old may have higher baseline troponin   levels and results should be interpreted in conjunction with the overall   clinical context.     NOTE: Troponin I testing is performed using a different   testing methodology at [facility] than at  other   system hospitals. Direct result comparisons should only   be made within the same method.   POCT GLUCOSE METER        XR chest 1 view   Final Result   1.  No acute findings on single AP view of the chest.   Signed by Korey Daniels MD      XR tibia fibula right 2 views   Final Result   1.  Oblique fracture of the distal shaft of the right tibia.   2.  Displaced fracture of the proximal right fibula.   Signed by Korey Daniels MD      XR ankle right 2 views   Final Result   Acute spiral fracture of the distal one third right tibia with medial   displacement of the distal component by a half shaft width.   Signed by Attila De León MD      CT ankle right wo IV contrast    (Results Pending)                 No data recorded     [place] Coma Scale Score: 15 (09/23/24 1015 : Abdelrahman Anderson RN)                     Medical Decision Making  Patient presents in a vacuum splint, I did give him 50 mcg of fentanyl and x-ray imagings were obtained, concerning for distal fibula fracture, spiral fracture.    Discussed the x-ray results with Dr. Macias, she recommended additional imaging which was ordered.  Labs were ordered as well as patient will require surgical intervention.    Given his comorbidities she recommended hospitalist admission.    CBC unremarkable, although metabolic panel is concerning, patient has a glucose of 458, sodium 134, potassium 5.7 with creatinine of 1.88.  Given a total of 2 L of IV fluid and 5 units of insulin which should help both with these glucose and to the hyperkalemia.    EKG at 1127 with ventricular of 69, as interpreted by me, shows normal sinus rhythm with normal axis normal interval, unremarkable ST and T wave patterns, no evidence of acute ischemia or other acute findings.    Placed in a posterior lower leg splint by the bedside nurse, MSPs intact post splint application.  Patient additionally given morphine as well for pain.  Plan of care discussed with the patient.            Shared KELTON  Attestation:    This patient was seen by the advanced practice provider.  I personally saw the patient and made/approved the management plan and take responsibility for the patient management.    History: 68-year-old male presents with right ankle pain after fall.    Exam: Regular rate and rhythm cardiac exam with clear breath sounds bilaterally.  Abdomen is soft and nontender.  Neurological exam is grossly intact.  There is tenderness to palpation to the right ankle with no obvious deformities.  The limb is neurovascularly intact.    MDM: Fracture, dislocation, sprain, contusion    I have seen and examined the patient, agree with the workup, evaluation, medical decision making, management and diagnosis.  The care plan has been discussed.    Srinivas Alfaro MD      Procedure  Procedures     Rashida Swift, CHRISTIANA-CNP  09/23/24 8428

## 2024-09-24 ENCOUNTER — ANESTHESIA (OUTPATIENT)
Dept: OPERATING ROOM | Facility: HOSPITAL | Age: 69
End: 2024-09-24
Payer: MEDICARE

## 2024-09-24 ENCOUNTER — APPOINTMENT (OUTPATIENT)
Dept: RADIOLOGY | Facility: HOSPITAL | Age: 69
End: 2024-09-24
Payer: MEDICARE

## 2024-09-24 PROBLEM — N18.9 CHRONIC RENAL INSUFFICIENCY: Status: ACTIVE | Noted: 2024-09-24

## 2024-09-24 PROBLEM — I10 HTN (HYPERTENSION): Status: ACTIVE | Noted: 2024-09-24

## 2024-09-24 PROBLEM — J44.9 CHRONIC OBSTRUCTIVE PULMONARY DISEASE (MULTI): Status: ACTIVE | Noted: 2024-09-24

## 2024-09-24 PROBLEM — K21.9 GASTROESOPHAGEAL REFLUX DISEASE: Status: ACTIVE | Noted: 2024-09-24

## 2024-09-24 PROBLEM — G47.33 OSA (OBSTRUCTIVE SLEEP APNEA): Status: ACTIVE | Noted: 2024-09-24

## 2024-09-24 PROBLEM — E11.9 DIABETES MELLITUS, TYPE 2 (MULTI): Status: ACTIVE | Noted: 2024-09-24

## 2024-09-24 PROBLEM — E78.5 HYPERLIPIDEMIA: Status: ACTIVE | Noted: 2024-09-24

## 2024-09-24 LAB
ANION GAP SERPL CALC-SCNC: 10 MMOL/L (ref 10–20)
BUN SERPL-MCNC: 12 MG/DL (ref 6–23)
CALCIUM SERPL-MCNC: 8.9 MG/DL (ref 8.6–10.3)
CHLORIDE SERPL-SCNC: 105 MMOL/L (ref 98–107)
CO2 SERPL-SCNC: 25 MMOL/L (ref 21–32)
CREAT SERPL-MCNC: 1.75 MG/DL (ref 0.5–1.3)
EGFRCR SERPLBLD CKD-EPI 2021: 42 ML/MIN/1.73M*2
ERYTHROCYTE [DISTWIDTH] IN BLOOD BY AUTOMATED COUNT: 12.4 % (ref 11.5–14.5)
EST. AVERAGE GLUCOSE BLD GHB EST-MCNC: 194 MG/DL
GLUCOSE BLD MANUAL STRIP-MCNC: 145 MG/DL (ref 74–99)
GLUCOSE BLD MANUAL STRIP-MCNC: 191 MG/DL (ref 74–99)
GLUCOSE BLD MANUAL STRIP-MCNC: 230 MG/DL (ref 74–99)
GLUCOSE BLD MANUAL STRIP-MCNC: 377 MG/DL (ref 74–99)
GLUCOSE SERPL-MCNC: 199 MG/DL (ref 74–99)
HBA1C MFR BLD: 8.4 %
HCT VFR BLD AUTO: 36.7 % (ref 41–52)
HGB BLD-MCNC: 12.2 G/DL (ref 13.5–17.5)
MAGNESIUM SERPL-MCNC: 1.7 MG/DL (ref 1.6–2.4)
MCH RBC QN AUTO: 30.7 PG (ref 26–34)
MCHC RBC AUTO-ENTMCNC: 33.2 G/DL (ref 32–36)
MCV RBC AUTO: 92 FL (ref 80–100)
NRBC BLD-RTO: 0 /100 WBCS (ref 0–0)
PLATELET # BLD AUTO: 158 X10*3/UL (ref 150–450)
POTASSIUM SERPL-SCNC: 5.1 MMOL/L (ref 3.5–5.3)
RBC # BLD AUTO: 3.98 X10*6/UL (ref 4.5–5.9)
SODIUM SERPL-SCNC: 135 MMOL/L (ref 136–145)
WBC # BLD AUTO: 6.8 X10*3/UL (ref 4.4–11.3)

## 2024-09-24 PROCEDURE — 7100000001 HC RECOVERY ROOM TIME - INITIAL BASE CHARGE: Performed by: ORTHOPAEDIC SURGERY

## 2024-09-24 PROCEDURE — 2720000007 HC OR 272 NO HCPCS: Performed by: ORTHOPAEDIC SURGERY

## 2024-09-24 PROCEDURE — 2500000001 HC RX 250 WO HCPCS SELF ADMINISTERED DRUGS (ALT 637 FOR MEDICARE OP)

## 2024-09-24 PROCEDURE — 3700000002 HC GENERAL ANESTHESIA TIME - EACH INCREMENTAL 1 MINUTE: Performed by: ORTHOPAEDIC SURGERY

## 2024-09-24 PROCEDURE — 2500000004 HC RX 250 GENERAL PHARMACY W/ HCPCS (ALT 636 FOR OP/ED): Performed by: NURSE ANESTHETIST, CERTIFIED REGISTERED

## 2024-09-24 PROCEDURE — 27759 TREATMENT OF TIBIA FRACTURE: CPT | Performed by: PHYSICIAN ASSISTANT

## 2024-09-24 PROCEDURE — 82947 ASSAY GLUCOSE BLOOD QUANT: CPT

## 2024-09-24 PROCEDURE — 36415 COLL VENOUS BLD VENIPUNCTURE: CPT | Performed by: REGISTERED NURSE

## 2024-09-24 PROCEDURE — 2500000001 HC RX 250 WO HCPCS SELF ADMINISTERED DRUGS (ALT 637 FOR MEDICARE OP): Performed by: NURSE PRACTITIONER

## 2024-09-24 PROCEDURE — 2500000002 HC RX 250 W HCPCS SELF ADMINISTERED DRUGS (ALT 637 FOR MEDICARE OP, ALT 636 FOR OP/ED): Performed by: REGISTERED NURSE

## 2024-09-24 PROCEDURE — 2500000005 HC RX 250 GENERAL PHARMACY W/O HCPCS: Performed by: NURSE ANESTHETIST, CERTIFIED REGISTERED

## 2024-09-24 PROCEDURE — 82374 ASSAY BLOOD CARBON DIOXIDE: CPT | Performed by: REGISTERED NURSE

## 2024-09-24 PROCEDURE — 2500000005 HC RX 250 GENERAL PHARMACY W/O HCPCS: Performed by: ORTHOPAEDIC SURGERY

## 2024-09-24 PROCEDURE — 83735 ASSAY OF MAGNESIUM: CPT | Performed by: REGISTERED NURSE

## 2024-09-24 PROCEDURE — 99232 SBSQ HOSP IP/OBS MODERATE 35: CPT | Performed by: REGISTERED NURSE

## 2024-09-24 PROCEDURE — 3600000004 HC OR TIME - INITIAL BASE CHARGE - PROCEDURE LEVEL FOUR: Performed by: ORTHOPAEDIC SURGERY

## 2024-09-24 PROCEDURE — 2500000004 HC RX 250 GENERAL PHARMACY W/ HCPCS (ALT 636 FOR OP/ED): Performed by: REGISTERED NURSE

## 2024-09-24 PROCEDURE — 2500000004 HC RX 250 GENERAL PHARMACY W/ HCPCS (ALT 636 FOR OP/ED): Performed by: STUDENT IN AN ORGANIZED HEALTH CARE EDUCATION/TRAINING PROGRAM

## 2024-09-24 PROCEDURE — 2500000004 HC RX 250 GENERAL PHARMACY W/ HCPCS (ALT 636 FOR OP/ED)

## 2024-09-24 PROCEDURE — 27759 TREATMENT OF TIBIA FRACTURE: CPT | Performed by: ORTHOPAEDIC SURGERY

## 2024-09-24 PROCEDURE — 2500000002 HC RX 250 W HCPCS SELF ADMINISTERED DRUGS (ALT 637 FOR MEDICARE OP, ALT 636 FOR OP/ED)

## 2024-09-24 PROCEDURE — 7100000002 HC RECOVERY ROOM TIME - EACH INCREMENTAL 1 MINUTE: Performed by: ORTHOPAEDIC SURGERY

## 2024-09-24 PROCEDURE — 83036 HEMOGLOBIN GLYCOSYLATED A1C: CPT | Mod: ELYLAB | Performed by: REGISTERED NURSE

## 2024-09-24 PROCEDURE — 3600000009 HC OR TIME - EACH INCREMENTAL 1 MINUTE - PROCEDURE LEVEL FOUR: Performed by: ORTHOPAEDIC SURGERY

## 2024-09-24 PROCEDURE — 3700000001 HC GENERAL ANESTHESIA TIME - INITIAL BASE CHARGE: Performed by: ORTHOPAEDIC SURGERY

## 2024-09-24 PROCEDURE — 1100000001 HC PRIVATE ROOM DAILY

## 2024-09-24 PROCEDURE — 85027 COMPLETE CBC AUTOMATED: CPT | Performed by: REGISTERED NURSE

## 2024-09-24 PROCEDURE — C1769 GUIDE WIRE: HCPCS | Performed by: ORTHOPAEDIC SURGERY

## 2024-09-24 PROCEDURE — 2500000004 HC RX 250 GENERAL PHARMACY W/ HCPCS (ALT 636 FOR OP/ED): Mod: JZ | Performed by: NURSE PRACTITIONER

## 2024-09-24 PROCEDURE — 2780000003 HC OR 278 NO HCPCS: Performed by: ORTHOPAEDIC SURGERY

## 2024-09-24 PROCEDURE — 2500000005 HC RX 250 GENERAL PHARMACY W/O HCPCS: Performed by: STUDENT IN AN ORGANIZED HEALTH CARE EDUCATION/TRAINING PROGRAM

## 2024-09-24 PROCEDURE — 0QSG04Z REPOSITION RIGHT TIBIA WITH INTERNAL FIXATION DEVICE, OPEN APPROACH: ICD-10-PCS | Performed by: ORTHOPAEDIC SURGERY

## 2024-09-24 DEVICE — IMPLANTABLE DEVICE: Type: IMPLANTABLE DEVICE | Site: LEG | Status: FUNCTIONAL

## 2024-09-24 RX ORDER — ENOXAPARIN SODIUM 100 MG/ML
40 INJECTION SUBCUTANEOUS DAILY
Status: CANCELLED | OUTPATIENT
Start: 2024-09-25

## 2024-09-24 RX ORDER — CEFAZOLIN SODIUM 2 G/50ML
2 SOLUTION INTRAVENOUS EVERY 8 HOURS
Status: DISCONTINUED | OUTPATIENT
Start: 2024-09-24 | End: 2024-09-24

## 2024-09-24 RX ORDER — SODIUM CHLORIDE, SODIUM LACTATE, POTASSIUM CHLORIDE, CALCIUM CHLORIDE 600; 310; 30; 20 MG/100ML; MG/100ML; MG/100ML; MG/100ML
100 INJECTION, SOLUTION INTRAVENOUS CONTINUOUS
Status: DISCONTINUED | OUTPATIENT
Start: 2024-09-24 | End: 2024-09-24

## 2024-09-24 RX ORDER — CHOLECALCIFEROL (VITAMIN D3) 25 MCG
1000 TABLET ORAL
Status: DISCONTINUED | OUTPATIENT
Start: 2024-09-24 | End: 2024-09-28 | Stop reason: HOSPADM

## 2024-09-24 RX ORDER — SODIUM CHLORIDE 0.9 G/100ML
IRRIGANT IRRIGATION AS NEEDED
Status: DISCONTINUED | OUTPATIENT
Start: 2024-09-24 | End: 2024-09-24 | Stop reason: HOSPADM

## 2024-09-24 RX ORDER — METFORMIN HYDROCHLORIDE 500 MG/1
500 TABLET ORAL
Status: DISCONTINUED | OUTPATIENT
Start: 2024-09-24 | End: 2024-09-26

## 2024-09-24 RX ORDER — SODIUM BICARBONATE 650 MG/1
650 TABLET ORAL 2 TIMES DAILY
Status: DISCONTINUED | OUTPATIENT
Start: 2024-09-24 | End: 2024-09-28 | Stop reason: HOSPADM

## 2024-09-24 RX ORDER — PHENYLEPHRINE HCL IN 0.9% NACL 1 MG/10 ML
SYRINGE (ML) INTRAVENOUS AS NEEDED
Status: DISCONTINUED | OUTPATIENT
Start: 2024-09-24 | End: 2024-09-24

## 2024-09-24 RX ORDER — LABETALOL HYDROCHLORIDE 5 MG/ML
5 INJECTION, SOLUTION INTRAVENOUS ONCE AS NEEDED
Status: DISCONTINUED | OUTPATIENT
Start: 2024-09-24 | End: 2024-09-24 | Stop reason: HOSPADM

## 2024-09-24 RX ORDER — ROCURONIUM BROMIDE 10 MG/ML
INJECTION, SOLUTION INTRAVENOUS AS NEEDED
Status: DISCONTINUED | OUTPATIENT
Start: 2024-09-24 | End: 2024-09-24

## 2024-09-24 RX ORDER — MIRTAZAPINE 15 MG/1
15 TABLET, FILM COATED ORAL NIGHTLY
Status: DISCONTINUED | OUTPATIENT
Start: 2024-09-24 | End: 2024-09-28 | Stop reason: HOSPADM

## 2024-09-24 RX ORDER — PROPOFOL 10 MG/ML
INJECTION, EMULSION INTRAVENOUS AS NEEDED
Status: DISCONTINUED | OUTPATIENT
Start: 2024-09-24 | End: 2024-09-24

## 2024-09-24 RX ORDER — GABAPENTIN 300 MG/1
300 CAPSULE ORAL 2 TIMES DAILY
Status: DISCONTINUED | OUTPATIENT
Start: 2024-09-24 | End: 2024-09-28 | Stop reason: HOSPADM

## 2024-09-24 RX ORDER — MIDAZOLAM HYDROCHLORIDE 1 MG/ML
INJECTION, SOLUTION INTRAMUSCULAR; INTRAVENOUS AS NEEDED
Status: DISCONTINUED | OUTPATIENT
Start: 2024-09-24 | End: 2024-09-24

## 2024-09-24 RX ORDER — MONTELUKAST SODIUM 10 MG/1
10 TABLET ORAL NIGHTLY
Status: DISCONTINUED | OUTPATIENT
Start: 2024-09-24 | End: 2024-09-28 | Stop reason: HOSPADM

## 2024-09-24 RX ORDER — ONDANSETRON HYDROCHLORIDE 2 MG/ML
INJECTION, SOLUTION INTRAVENOUS AS NEEDED
Status: DISCONTINUED | OUTPATIENT
Start: 2024-09-24 | End: 2024-09-24

## 2024-09-24 RX ORDER — BUPROPION HYDROCHLORIDE 150 MG/1
150 TABLET, EXTENDED RELEASE ORAL 2 TIMES DAILY
Status: DISCONTINUED | OUTPATIENT
Start: 2024-09-24 | End: 2024-09-28 | Stop reason: HOSPADM

## 2024-09-24 RX ORDER — FLUTICASONE FUROATE AND VILANTEROL 200; 25 UG/1; UG/1
1 POWDER RESPIRATORY (INHALATION) DAILY
Status: DISCONTINUED | OUTPATIENT
Start: 2024-09-24 | End: 2024-09-28 | Stop reason: HOSPADM

## 2024-09-24 RX ORDER — FENTANYL CITRATE 50 UG/ML
50 INJECTION, SOLUTION INTRAMUSCULAR; INTRAVENOUS EVERY 5 MIN PRN
Status: DISCONTINUED | OUTPATIENT
Start: 2024-09-24 | End: 2024-09-24 | Stop reason: HOSPADM

## 2024-09-24 RX ORDER — DROPERIDOL 2.5 MG/ML
0.62 INJECTION, SOLUTION INTRAMUSCULAR; INTRAVENOUS ONCE AS NEEDED
Status: DISCONTINUED | OUTPATIENT
Start: 2024-09-24 | End: 2024-09-24 | Stop reason: HOSPADM

## 2024-09-24 RX ORDER — DIPHENHYDRAMINE HYDROCHLORIDE 50 MG/ML
12.5 INJECTION INTRAMUSCULAR; INTRAVENOUS ONCE AS NEEDED
Status: DISCONTINUED | OUTPATIENT
Start: 2024-09-24 | End: 2024-09-24 | Stop reason: HOSPADM

## 2024-09-24 RX ORDER — CEFAZOLIN SODIUM 2 G/100ML
2 INJECTION, SOLUTION INTRAVENOUS EVERY 8 HOURS
Status: COMPLETED | OUTPATIENT
Start: 2024-09-24 | End: 2024-09-25

## 2024-09-24 RX ORDER — SODIUM CHLORIDE 9 MG/ML
100 INJECTION, SOLUTION INTRAVENOUS CONTINUOUS
Status: DISCONTINUED | OUTPATIENT
Start: 2024-09-24 | End: 2024-09-28

## 2024-09-24 RX ORDER — LIDOCAINE HYDROCHLORIDE 10 MG/ML
0.1 INJECTION, SOLUTION EPIDURAL; INFILTRATION; INTRACAUDAL; PERINEURAL ONCE
Status: DISCONTINUED | OUTPATIENT
Start: 2024-09-24 | End: 2024-09-24 | Stop reason: HOSPADM

## 2024-09-24 RX ORDER — FENTANYL CITRATE 50 UG/ML
INJECTION, SOLUTION INTRAMUSCULAR; INTRAVENOUS AS NEEDED
Status: DISCONTINUED | OUTPATIENT
Start: 2024-09-24 | End: 2024-09-24

## 2024-09-24 RX ORDER — CEFAZOLIN SODIUM 2 G/100ML
2 INJECTION, SOLUTION INTRAVENOUS ONCE
Status: COMPLETED | OUTPATIENT
Start: 2024-09-24 | End: 2024-09-24

## 2024-09-24 RX ORDER — LIDOCAINE HYDROCHLORIDE 20 MG/ML
INJECTION, SOLUTION INFILTRATION; PERINEURAL AS NEEDED
Status: DISCONTINUED | OUTPATIENT
Start: 2024-09-24 | End: 2024-09-24

## 2024-09-24 RX ORDER — ATORVASTATIN CALCIUM 20 MG/1
40 TABLET, FILM COATED ORAL NIGHTLY
Status: DISCONTINUED | OUTPATIENT
Start: 2024-09-24 | End: 2024-09-28 | Stop reason: HOSPADM

## 2024-09-24 RX ORDER — TAMSULOSIN HYDROCHLORIDE 0.4 MG/1
0.4 CAPSULE ORAL
Status: DISCONTINUED | OUTPATIENT
Start: 2024-09-24 | End: 2024-09-28 | Stop reason: HOSPADM

## 2024-09-24 SDOH — HEALTH STABILITY: MENTAL HEALTH: CURRENT SMOKER: 0

## 2024-09-24 ASSESSMENT — COGNITIVE AND FUNCTIONAL STATUS - GENERAL
DAILY ACTIVITIY SCORE: 17
DRESSING REGULAR UPPER BODY CLOTHING: A LITTLE
MOVING TO AND FROM BED TO CHAIR: A LOT
DRESSING REGULAR LOWER BODY CLOTHING: A LOT
MOVING FROM LYING ON BACK TO SITTING ON SIDE OF FLAT BED WITH BEDRAILS: A LITTLE
PERSONAL GROOMING: A LITTLE
DRESSING REGULAR LOWER BODY CLOTHING: A LOT
TURNING FROM BACK TO SIDE WHILE IN FLAT BAD: A LITTLE
HELP NEEDED FOR BATHING: A LITTLE
CLIMB 3 TO 5 STEPS WITH RAILING: A LOT
TOILETING: A LOT
HELP NEEDED FOR BATHING: A LITTLE
CLIMB 3 TO 5 STEPS WITH RAILING: A LOT
WALKING IN HOSPITAL ROOM: A LOT
PERSONAL GROOMING: A LITTLE
STANDING UP FROM CHAIR USING ARMS: A LOT
STANDING UP FROM CHAIR USING ARMS: A LOT
MOVING FROM LYING ON BACK TO SITTING ON SIDE OF FLAT BED WITH BEDRAILS: A LITTLE
DAILY ACTIVITIY SCORE: 17
WALKING IN HOSPITAL ROOM: A LOT
MOBILITY SCORE: 14
MOBILITY SCORE: 14
TOILETING: A LOT
MOVING TO AND FROM BED TO CHAIR: A LOT
TURNING FROM BACK TO SIDE WHILE IN FLAT BAD: A LITTLE
DRESSING REGULAR UPPER BODY CLOTHING: A LITTLE

## 2024-09-24 ASSESSMENT — PAIN SCALES - GENERAL
PAINLEVEL_OUTOF10: 8
PAINLEVEL_OUTOF10: 0 - NO PAIN
PAINLEVEL_OUTOF10: 8
PAINLEVEL_OUTOF10: 0 - NO PAIN
PAINLEVEL_OUTOF10: 9
PAINLEVEL_OUTOF10: 0 - NO PAIN
PAINLEVEL_OUTOF10: 1
PAINLEVEL_OUTOF10: 8
PAIN_LEVEL: 0
PAINLEVEL_OUTOF10: 6

## 2024-09-24 ASSESSMENT — PAIN - FUNCTIONAL ASSESSMENT
PAIN_FUNCTIONAL_ASSESSMENT: 0-10

## 2024-09-24 ASSESSMENT — PAIN DESCRIPTION - DESCRIPTORS: DESCRIPTORS: THROBBING

## 2024-09-24 ASSESSMENT — ACTIVITIES OF DAILY LIVING (ADL): LACK_OF_TRANSPORTATION: NO

## 2024-09-24 ASSESSMENT — PAIN DESCRIPTION - ORIENTATION: ORIENTATION: RIGHT

## 2024-09-24 ASSESSMENT — PAIN DESCRIPTION - LOCATION: LOCATION: LEG

## 2024-09-24 NOTE — PROGRESS NOTES
Physical Therapy                 Therapy Communication Note    Patient Name: Jorge Barrera  MRN: [id number]  Department: [facility]  Room: [facility]622-  Today's Date: 9/24/2024 327    Discipline: Physical Therapy      Comment: Patient requiring surgical intervention for his ankle tibia fracture. Suggest reorder post op when appropriate

## 2024-09-24 NOTE — ANESTHESIA PROCEDURE NOTES
Peripheral Block    Patient location during procedure: pre-op  Start time: 9/24/2024 12:50 PM  End time: 9/24/2024 1:00 PM  Reason for block: at surgeon's request and post-op pain management  Staffing  Performed: attending   Authorized by: Dusty Yee DO    Performed by: Dusty Yee DO  Preanesthetic Checklist  Completed: patient identified, IV checked, site marked, risks and benefits discussed, surgical consent, monitors and equipment checked, pre-op evaluation and timeout performed   Timeout performed at:   Peripheral Block  Patient position: laying flat  Prep: ChloraPrep  Patient monitoring: heart rate, continuous pulse ox and cardiac monitor  Block type: adductor canal  Laterality: right  Injection technique: single-shot  Guidance: ultrasound guided  Local infiltration: lidocaine  Infiltration strength: 1 %  Dose: 2 mL  Needle  Needle gauge: 21 G  Needle length: 10 cm  Needle localization: ultrasound guidance     image stored in chart  Assessment  Injection assessment: negative aspiration for heme, no paresthesia on injection, incremental injection and local visualized surrounding nerve on ultrasound  Heart rate change: no  Additional Notes  Time out performed. 20 ml .5 % Ropivacaine with 5mg Decadron used in divided doses. Patient tolerated procedure well.

## 2024-09-24 NOTE — ANESTHESIA PROCEDURE NOTES
Peripheral Block    Patient location during procedure: pre-op  Start time: 9/24/2024 12:50 PM  End time: 9/24/2024 1:00 PM  Reason for block: at surgeon's request and post-op pain management  Staffing  Performed: attending   Authorized by: Dusty Yee DO    Performed by: Dusty Yee DO  Preanesthetic Checklist  Completed: patient identified, IV checked, site marked, risks and benefits discussed, surgical consent, monitors and equipment checked, pre-op evaluation and timeout performed   Timeout performed at:   Peripheral Block  Patient position: laying flat  Prep: ChloraPrep  Patient monitoring: heart rate, continuous pulse ox and cardiac monitor  Block type: popliteal and sciatic  Laterality: right  Injection technique: single-shot  Guidance: ultrasound guided  Local infiltration: lidocaine  Infiltration strength: 1 %  Dose: 2 mL  Needle  Needle gauge: 21 G  Needle length: 10 cm  Needle localization: ultrasound guidance     image stored in chart  Assessment  Injection assessment: negative aspiration for heme, no paresthesia on injection, incremental injection and local visualized surrounding nerve on ultrasound  Heart rate change: no  Additional Notes  Time out performed. 20 ml .5 % Ropivacaine with 5mg Decadron used in divided doses. Patient tolerated procedure well.

## 2024-09-24 NOTE — PROGRESS NOTES
Jorge Barrera is a 68 y.o. male on day 1 of admission presenting with Closed fracture of right distal tibia.      Subjective   Patient examined and seen. Alert and oriented x3, resting comfortably.  Patient denies chest pain, shortness of breath, palpitations, abdominal pain, fever or chills. Reports he is having pain in his right lower extremity- as to be expected.          Objective     Last Recorded Vitals  /74 (BP Location: Right leg)   Pulse 80   Temp 37.1 °C (98.8 °F) (Temporal)   Resp 14   Wt 72.6 kg (160 lb)   SpO2 99%   Intake/Output last 3 Shifts:    Intake/Output Summary (Last 24 hours) at 9/24/2024 1350  Last data filed at 9/24/2024 1319  Gross per 24 hour   Intake 1613.33 ml   Output 200 ml   Net 1413.33 ml       Admission Weight  Weight: 72.6 kg (160 lb) (09/23/24 1014)    Daily Weight  09/23/24 : 72.6 kg (160 lb)    Image Results  CT ankle right wo IV contrast  Narrative: STUDY:  CT Extremity; Completed Time:  9/23/2024 12:10 PM  INDICATION:  Evaluate fracture for extension into the joint.  COMPARISON:  XR ankle 9/23/2024.  ACCESSION NUMBER(S):  [id number]  ORDERING CLINICIAN:  ANNI CALABRESE  TECHNIQUE:  Thin section axial images were obtained through the right  ankle without intravenous contrast.  Orthogonal reconstructed images  were obtained and reviewed.    Automated mA/kV exposure control was utilized and patient examination  was performed in strict accordance with principles of ALARA.  FINDINGS: Diffuse demineralization. The nondisplaced spiral fracture  of the distal tibia seen on radiographs is not fully included on  imaging. Very subtle spiral extension into the junction between the  posterior and middle thirds of the tibial plafond laterally is seen,  slice 22 of series 206. Mild involvement of the posteromedial aspect  of the tibial plafond, slice 38 series 202. There is also involvement  of the anterolateral tibial plafond with minimal anterior displacement  slice 40 of series  202. Please also refer to slice 37 of series 204.   Also noted is fracture of the posterior lateral aspect of the talus  slice 42 of series 202 associated with subchondral lucency. This  fracture is closely adjacent to a partially detached os trigonum. Tiny  acute avulsion fracture of the anterior margin of the lateral  malleolus is suspected. There is also mild chronic deformity of the  lateral malleolus.  Mild osteoarthritis of the tibiotalar, subtalar, talonavicular and  calcaneocuboid joint midfoot and tarsometatarsal joints. Corticated  accessory navicular.  Tendons and ligaments are not targeted for full evaluation.  Constellation of abnormalities would be consistent with syndesmotic  ligament injury, noting proximal fibular fracture in keeping with  Maissoneuve  injury pattern. Mild medial flexor and peroneal tendinosis is  appreciated.  Additional lateral ligamentous injury is questioned as there is  possible tiny anterior lateral malleolar fracture. The anterior  extensor tendons appear grossly intact. Probable Achilles tendinosis  and central cord predominant plantar fasciitis. Sinus tarsi is intact.  Incidental partial fusion between the navicular and the cuboid perhaps  related to previous trauma. Please refer to sagittal slice 23.  Extensive atherosclerosis.  Impression: Spiral distal tibial diaphyseal fracture does extend into the tibial  plafond, with a mildly comminuted pattern. There is dominant displaced  transverse component at the junction between the middle and posterior  thirds of the tibial plafond, laterally more conspicuously than  medially, and there is also extension through the anterolateral aspect  of the tibial plafond. This pattern of injury would be associated with  syndesmotic injury, both anterior and posterior suspected, more severe  anteriorly.  Tiny avulsion fracture of the tip of the lateral malleolus with  suspected associated lateral ligamentous injury.  Nondisplaced fracture  of the posterior lateral talar dome associated  with subchondral lucency.  Signed by Damari Trujillo MD  XR chest 1 view  Narrative: STUDY:2  Chest Radiograph;  9/23/2024, 1155  INDICATION:  Preop.  COMPARISON:  XR chest 4/13/2022, 4/12/2022, 617/2021  ACCESSION NUMBER(S):  [id number]  ORDERING CLINICIAN:  ANNI CALABRESE  TECHNIQUE:  Frontal chest was obtained at 1155 hours.  FINDINGS:  There is minimal linear scarring in the left lung base.  No acute  opacities or effusions are visualized.  Heart size is within normal  limits.  There is no evidence of pneumothorax.  Impression: 1.  No acute findings on single AP view of the chest.  Signed by Korey Daniels MD  XR tibia fibula right 2 views  Narrative: STUDY:  Tibia and Fibula Radiographs; 9/23/2024 at 11:54 AM.  INDICATION:  Evaluate for remainder of the tibia.  COMPARISON:  XR right ankle 9/23/2024.  ACCESSION NUMBER(S):  [id number]  ORDERING CLINICIAN:  ANNI CALABRESE  TECHNIQUE:  Two view(s) of the right tibia and fibula.  FINDINGS:    There is an oblique fracture of the distal shaft of the right tibia.   There is a displaced fracture of the proximal right fibula.  Soft  tissue swelling is noted.  Impression: 1.  Oblique fracture of the distal shaft of the right tibia.  2.  Displaced fracture of the proximal right fibula.  Signed by Korey Daniels MD  XR ankle right 2 views  Narrative: STUDY:  Ankle Radiographs;  9/23/2024, 1111  INDICATION:  Fall, right ankle pain.  COMPARISON:  None Available.  ACCESSION NUMBER(S):  [id number]  ORDERING CLINICIAN:  ANNI CALABRESE  TECHNIQUE:  2 view(s) of the right ankle.  FINDINGS:    There is an acute spiral fracture of the distal one third right tibia  with medial displacement of the distal component by a half shaft  width.  No fracture of the fibula.  No disruption of the ankle  mortise. No soft tissue abnormality is seen.  Impression: Acute spiral fracture of the distal one third right tibia with medial  displacement of  the distal component by a half shaft width.  Signed by Attila De León MD  ECG 12 lead  Normal sinus rhythm  Normal ECG  When compared with ECG of 13-APR-2022 02:02,  Nonspecific T wave abnormality no longer evident in Lateral leads      Physical Exam  Constitutional: Well developed, awake/alert/oriented x3, , cooperative + hx of dysphagia  Respiratory/Thorax: Patent airways,  normal breath sounds with good chest expansion, thorax symmetric  Cardiovascular: Regular, rate and rhythm,   normal S 1and S 2  Musculoskeletal:  Right lower extremity in splint, toes are warm and pink, capillary refills < 2   Extremities: normal extremities,  no contusions or wounds seen  except as noted above   Skin: warm, dry, intact  Neurological: alert/oriented x 3, speech clear,   Psychiatric: appropriate mood and behavior    Relevant Results  Scheduled medications  atorvastatin, 40 mg, oral, Nightly  buPROPion SR, 150 mg, oral, BID  carvedilol, 6.25 mg, oral, BID  cholecalciferol, 1,000 Units, oral, Daily  fluticasone furoate-vilanteroL, 1 puff, inhalation, Daily  gabapentin, 300 mg, oral, BID  [Transfer Hold] insulin lispro, 0-10 Units, subcutaneous, Before meals & nightly  lisinopril, 10 mg, oral, Daily  [Held by provider] metFORMIN, 500 mg, oral, Daily with breakfast  mirtazapine, 15 mg, oral, Nightly  montelukast, 10 mg, oral, Nightly  [Transfer Hold] polyethylene glycol, 17 g, oral, Daily  [Held by provider] sodium bicarbonate, 650 mg, oral, BID  tamsulosin, 0.4 mg, oral, Daily  zolpidem, 10 mg, oral, Nightly      Continuous medications  lactated Ringer's, 100 mL/hr  sodium chloride 0.9%, 50 mL/hr, Last Rate: 50 mL/hr (09/24/24 1304)      PRN medications  PRN medications: [Transfer Hold] acetaminophen **OR** [Transfer Hold] acetaminophen **OR** [Transfer Hold] acetaminophen, [Transfer Hold] HYDROcodone-acetaminophen, [Transfer Hold] melatonin, [Transfer Hold] morphine, [Transfer Hold] naloxone, [Transfer Hold] ondansetron **OR**  [Transfer Hold] ondansetron, sodium chloride    Results for orders placed or performed during the hospital encounter of 09/23/24 (from the past 24 hour(s))   POCT GLUCOSE   Result Value Ref Range    POCT Glucose 247 (H) 74 - 99 mg/dL   POCT GLUCOSE   Result Value Ref Range    POCT Glucose 237 (H) 74 - 99 mg/dL   Potassium   Result Value Ref Range    Potassium 4.6 3.5 - 5.3 mmol/L   POCT GLUCOSE   Result Value Ref Range    POCT Glucose 242 (H) 74 - 99 mg/dL   POCT GLUCOSE   Result Value Ref Range    POCT Glucose 237 (H) 74 - 99 mg/dL   Basic metabolic panel   Result Value Ref Range    Glucose 199 (H) 74 - 99 mg/dL    Sodium 135 (L) 136 - 145 mmol/L    Potassium 5.1 3.5 - 5.3 mmol/L    Chloride 105 98 - 107 mmol/L    Bicarbonate 25 21 - 32 mmol/L    Anion Gap 10 10 - 20 mmol/L    Urea Nitrogen 12 6 - 23 mg/dL    Creatinine 1.75 (H) 0.50 - 1.30 mg/dL    eGFR 42 (L) >60 mL/min/1.73m*2    Calcium 8.9 8.6 - 10.3 mg/dL   CBC   Result Value Ref Range    WBC 6.8 4.4 - 11.3 x10*3/uL    nRBC 0.0 0.0 - 0.0 /100 WBCs    RBC 3.98 (L) 4.50 - 5.90 x10*6/uL    Hemoglobin 12.2 (L) 13.5 - 17.5 g/dL    Hematocrit 36.7 (L) 41.0 - 52.0 %    MCV 92 80 - 100 fL    MCH 30.7 26.0 - 34.0 pg    MCHC 33.2 32.0 - 36.0 g/dL    RDW 12.4 11.5 - 14.5 %    Platelets 158 150 - 450 x10*3/uL   Magnesium   Result Value Ref Range    Magnesium 1.70 1.60 - 2.40 mg/dL   POCT GLUCOSE   Result Value Ref Range    POCT Glucose 230 (H) 74 - 99 mg/dL   POCT GLUCOSE   Result Value Ref Range    POCT Glucose 145 (H) 74 - 99 mg/dL         Assessment & Plan        # Oblique fracture of the distal shaft of the right tibia.   Displaced fracture of the proximal right fibula.      Admit to Hospital Team   Orthopedics  team Consulted for surgery in AM   DVTp and Pain management per Ortho   PT/OT evaluation  and treatment   CBC/BMP as appropriate, review results  Pulmonary Toileting   Follow up as needed outpatient with Orthopedics at discharge         # Diabetes Mellitus  Type 2 Insulin Dependent   Continue home medications  Diet Cardiac/Diabetic  Continue Sliding Scale SSI AC/HS   A1C 6.8 from 8/2024 - repeat in process   Encourage healthy lifestyle changes     # HTN/ HLD  Hemodynamically stable  Telemetry for arrhythmia monitoring    Continue Statin  Continue Coreg  Hold Lisinopril until post op labs reviewed   Labs daily      # BPH  Continue home mediations      # CKD Stage 3b  Avoid nephrotoxic agents  EMR indicates baseline 1.95   Today 1.75   Hold Lisinopril due to CKD and post op     # Hyperkalemia  Repeat potassium   4.6  Monitor     # COPD  , not in exacerbation   Continue bronchodilators   Encourage ambulation   Wean oxygen to keep sats above 92%   Pulmonary Toileting            FULL CODE  Disposition: 48-72 hours   Possible Rehab Placement will be needed  DVTp: Defer to Orthopedics      Time spent  36 minutes obtaining labs, imaging, recommendations, interview, assessment, examination, medication review/ordering, and EMR review.     Plan of care was discussed extensively with patient, RN and Primary attending Dr. Celaya. Patient verbalized understanding through teach back method. All questions and concerns addressed upon examination.      Of note, this documentation is completed using the Dragon Dictation system (voice recognition software). There may be spelling and/or grammatical errors that were not corrected prior to final submission.     Mery Dyer, APRN-CNP            Mery Dyer, APRN-CNP

## 2024-09-24 NOTE — SIGNIFICANT EVENT
Internal Medicine -    Patient has a history of COPD  Not in exacerbation at this time time.   ARISCAT score     27 points  Age  +3  Preoperative Sp02 91-95% + 8  Surgery 2-3 hours  + 16 points    27 total points estimated  INTERMEDIATE risk for post op pulmonary complications       09/24/24 at 9:01 AM - CHRISTIANA Avery-CNP

## 2024-09-24 NOTE — CARE PLAN
Problem: Pain  Goal: Free from opioid side effects throughout the shift  Outcome: Progressing     Problem: Fall/Injury  Goal: Not fall by end of shift  Outcome: Progressing  Goal: Use assistive devices by end of the shift  Outcome: Progressing  Goal: Be free from injury by end of the shift  Outcome: Progressing  Goal: Verbalize understanding of personal risk factors for fall in the hospital  Outcome: Progressing  Goal: Verbalize understanding of risk factor reduction measures to prevent injury from fall in the home  Outcome: Progressing  Goal: Pace activities to prevent fatigue by end of the shift  Outcome: Progressing     Problem: Pain - Adult  Goal: Verbalizes/displays adequate comfort level or baseline comfort level  Outcome: Progressing     Problem: Safety - Adult  Goal: Free from fall injury  Outcome: Progressing     Problem: Discharge Planning  Goal: Discharge to home or other facility with appropriate resources  Outcome: Progressing     Problem: Chronic Conditions and Co-morbidities  Goal: Patient's chronic conditions and co-morbidity symptoms are monitored and maintained or improved  Outcome: Progressing     Problem: Skin  Goal: Decreased wound size/increased tissue granulation at next dressing change  Outcome: Progressing  Goal: Participates in plan/prevention/treatment measures  Outcome: Progressing  Goal: Prevent/manage excess moisture  Outcome: Progressing  Goal: Prevent/minimize sheer/friction injuries  Outcome: Progressing  Goal: Promote/optimize nutrition  Outcome: Progressing  Goal: Promote skin healing  Outcome: Progressing     Problem: Diabetes  Goal: Achieve decreasing blood glucose levels by end of shift  Outcome: Progressing  Goal: Increase stability of blood glucose readings by end of shift  Outcome: Progressing  Goal: Decrease in ketones present in urine by end of shift  Outcome: Progressing  Goal: Maintain electrolyte levels within acceptable range throughout shift  Outcome:  Progressing  Goal: Maintain glucose levels >70mg/dl to <250mg/dl throughout shift  Outcome: Progressing  Goal: No changes in neurological exam by end of shift  Outcome: Progressing  Goal: Learn about and adhere to nutrition recommendations by end of shift  Outcome: Progressing  Goal: Vital signs within normal range for age by end of shift  Outcome: Progressing  Goal: Increase self care and/or family involovement by end of shift  Outcome: Progressing  Goal: Receive DSME education by end of shift  Outcome: Progressing   The patient's goals for the shift include      The clinical goals for the shift include pain control

## 2024-09-24 NOTE — ANESTHESIA POSTPROCEDURE EVALUATION
Patient: Jorge Barrera    Procedure Summary       Date: 09/24/24 Room / Location: [facility] / [facility]    Anesthesia Start: 1304 Anesthesia Stop: 1433    Procedure: OPEN REDUCTION INTERNAL FIXATION RIGHT TIBIAL NAILING (Right: Leg Lower) Diagnosis:       Closed fracture of distal end of right tibia, unspecified fracture morphology, initial encounter      (Closed fracture of distal end of right tibia, unspecified fracture morphology, initial encounter [S82.301A])    Surgeons: Sarthak Ramos MD Responsible Provider: Luis Enrique Cunningham MD    Anesthesia Type: regional, general ASA Status: 3            Anesthesia Type: regional, general    Vitals Value Taken Time   /68 09/24/24 1430   Temp 36 09/24/24 1433   Pulse 80 09/24/24 1433   Resp 10 09/24/24 1433   SpO2 100 09/24/24 1433   Vitals shown include unfiled device data.    Anesthesia Post Evaluation    Patient location during evaluation: bedside  Patient participation: waiting for patient participation  Level of consciousness: sleepy but conscious  Pain score: 0  Pain management: adequate  Airway patency: patent  Cardiovascular status: acceptable and stable  Respiratory status: acceptable and face mask  Hydration status: stable  Postoperative Nausea and Vomiting: none        No notable events documented.

## 2024-09-24 NOTE — SIGNIFICANT EVENT
Internal Medicine-  Spoke with NP Adjondi - request Lovenox 1 dose prior to sx  Then will determine DVTp post op.   Recommend Lovenox per Orthopedics Team      09/24/24 at 8:47 AM - CHRISTIANA Avery-CNP

## 2024-09-24 NOTE — OP NOTE
OPEN REDUCTION INTERNAL FIXATION RIGHT TIBIAL NAILING (R) Operative Note    Preop diagnosis: Displaced right distal third tibial shaft fracture    Postop diagnosis: Same    Procedure: Intramedullary nailing of right distal third tibial shaft fracture using a Synthes 315 mm x 11 mm tibial EX nail with 1 proximal locking screw and 2 distal locking screws    Surgeon: Sarthak Ramos MD  Assistant: Andressa Cruz PA-C      Findings: see procedure details    EBL: minimal    Procedure Details:   Indications: The patient sustained a right tibial fracture as well as distal fibular fracture.  Risk and benefits of operative stabilization were discussed at length with the patient.  These do include infection, stiffness, nerve damage, continued pain, malunion and nonunion as well as need for subsequent operations.  Specifically we discussed the risk of infection given the open nature of her injury.  Given the options limitations elected to proceed.  Informed consent was obtained prior to arrival in the operating.    Procedure: Upon arrival the patient was identified.  He was transported from a stretcher to the operative table placed by position.  An LMA was administered by the anesthesia staff.  Prior to start the case intravenous antibiotics were given.  A tourniquet is placed about the right leg.  The leg was exsanguinated and the tourniquet was inflated to 300 mmHg. Manual traction provided adequate reduction which was held in place for adequate reduction.  A 3 cm incision made at the superior aspect of the patella extending proximally.  The the incision was carried through the subcutaneous tissue and hemostasis was obtained.  The quadricep tendon was split starting at the superior pole of the patella.  Adhesions beneath the patella were elevated with finger dissection.  Under C-arm guidance a starting position for the nail was identified using the suprapatellar protector guide.  The rigid guidewire was inserted first  followed by the proximal reamer. The ball-tipped guidewire was then advanced to the fracture site.  The fracture was noted to be reduced and the guidewire was taken to the distal physeal scar.  The guidewire was measured and reaming began at 8 mm continuing until a tight fit was obtained.  A Synthes tibial nail was then inserted atop the guidewire with good placement noted on all views.  The guidewire was removed.  2 distal locking screws were placed under C-arm guidance.  1 proximal screw was placed again under C-arm guidance.   Final x-rays confirm proper fracture alignment and hardware placement in all planes.  Wounds were irrigated with sterile saline.  They were closed with 0 Vicryl, 2-0 Vicryl and staples.  All sponge and needle counts are correct prior to closure.  Sterile testing was applied.  The patient was placed in a well-padded 3 sided splint.  They were awoken from the anesthetic and taken to the recovery room in stable condition.    Andressa Cruz PA-C was present throughout the entire case. Given the nature of the disease process and the procedure, a skilled surgical first assistant was necessary during the case. The assistant was necessary to hold retractors and to manipulate the extremity during the procedure. A certified scrub tech was at the back table managing the instruments and supplies for the surgical case.    Complications:  None; patient tolerated the procedure well.    Disposition: PACU - hemodynamically stable.  Condition: stable         Sarthak Ramos  Phone Number: [phone]

## 2024-09-24 NOTE — CARE PLAN
Problem: Pain  Goal: Free from opioid side effects throughout the shift  Outcome: Progressing     Problem: Fall/Injury  Goal: Not fall by end of shift  Outcome: Progressing  Goal: Use assistive devices by end of the shift  Outcome: Progressing  Goal: Be free from injury by end of the shift  Outcome: Progressing  Goal: Verbalize understanding of personal risk factors for fall in the hospital  Outcome: Progressing  Goal: Verbalize understanding of risk factor reduction measures to prevent injury from fall in the home  Outcome: Progressing  Goal: Pace activities to prevent fatigue by end of the shift  Outcome: Progressing     Problem: Pain - Adult  Goal: Verbalizes/displays adequate comfort level or baseline comfort level  Outcome: Progressing     Problem: Safety - Adult  Goal: Free from fall injury  Outcome: Progressing     Problem: Discharge Planning  Goal: Discharge to home or other facility with appropriate resources  Outcome: Progressing     Problem: Chronic Conditions and Co-morbidities  Goal: Patient's chronic conditions and co-morbidity symptoms are monitored and maintained or improved  Outcome: Progressing     Problem: Skin  Goal: Decreased wound size/increased tissue granulation at next dressing change  Outcome: Progressing  Goal: Participates in plan/prevention/treatment measures  Outcome: Progressing  Goal: Prevent/manage excess moisture  Outcome: Progressing  Goal: Prevent/minimize sheer/friction injuries  Outcome: Progressing  Goal: Promote/optimize nutrition  Outcome: Progressing  Goal: Promote skin healing  Outcome: Progressing     Problem: Diabetes  Goal: Achieve decreasing blood glucose levels by end of shift  Outcome: Progressing  Goal: Increase stability of blood glucose readings by end of shift  Outcome: Progressing  Goal: Decrease in ketones present in urine by end of shift  Outcome: Progressing  Goal: Maintain electrolyte levels within acceptable range throughout shift  Outcome:  Progressing  Goal: Maintain glucose levels >70mg/dl to <250mg/dl throughout shift  Outcome: Progressing  Goal: No changes in neurological exam by end of shift  Outcome: Progressing  Goal: Learn about and adhere to nutrition recommendations by end of shift  Outcome: Progressing  Goal: Vital signs within normal range for age by end of shift  Outcome: Progressing  Goal: Increase self care and/or family involovement by end of shift  Outcome: Progressing  Goal: Receive DSME education by end of shift  Outcome: Progressing

## 2024-09-24 NOTE — PROGRESS NOTES
09/24/24 1409   Discharge Planning   Living Arrangements Alone   Support Systems Friends/neighbors   Assistance Needed none, pt states PTA independent ADLS and IADLS no AD, drives, fell PTA slipped on wheelchair ramp, states 1 other fall last 3 months. Pt has tub shower with grab bar, seat and hand-held shower, also walk-in shower available, ramp entry, no other dme. Pt gets meals on wheels, pt has history of Parkinson's   Type of Residence Private residence  (1 level home)   Number of Stairs to Enter Residence 0  (ramp entry)   Number of Stairs Within Residence 0   Do you have animals or pets at home? Yes   Type of Animals or Pets 1 Dog (small Shitzu named Jack)- friend caring for currently   Home or Post Acute Services In home services   Type of Home Care Services Home OT;Home PT   Expected Discharge Disposition Home H   Does the patient need discharge transport arranged? No   Financial Resource Strain   How hard is it for you to pay for the very basics like food, housing, medical care, and heating? Not hard   Housing Stability   In the last 12 months, was there a time when you were not able to pay the mortgage or rent on time? N   In the past 12 months, how many times have you moved where you were living? 0   At any time in the past 12 months, were you homeless or living in a shelter (including now)? N   Transportation Needs   In the past 12 months, has lack of transportation kept you from medical appointments or from getting medications? no   In the past 12 months, has lack of transportation kept you from meetings, work, or from getting things needed for daily living? No     Pt admitted after slip and fall on ramp entry to home with DX closed fracture of right distal tibia. Pt is scheduled to go to OR today for IM nailing Right tibia. Pt lives alone in 1 level apartment with ramp entry, states has neighbors that can assist as needed,  pt states PTA independent ADLS and IADLS no AD, drives, fell PTA slipped on  wheelchair ramp, states 1 other fall last 3 months. Pt has tub shower with grab bar, seat and hand-held shower, also walk-in shower available, ramp entry, no other dme. Pt gets meals on wheels, pt has history of Parkinson's. Pt states anticipates home after surgery, does not want SNF, agreeable to post op therapy evals for recommendations and CT team to follow up for DC planning needs.

## 2024-09-24 NOTE — ANESTHESIA PROCEDURE NOTES
Airway  Date/Time: 9/24/2024 1:12 PM  Urgency: elective    Airway not difficult    Staffing  Performed: attending   Authorized by: Dusty Yee DO    Performed by: Dusty Yee DO  Patient location during procedure: OR    Indications and Patient Condition  Indications for airway management: anesthesia  Sedation level: deep  Preoxygenated: yes      Final Airway Details  Final airway type: endotracheal airway      Successful airway: ETT     Successful intubation technique: direct laryngoscopy  Facilitating devices/methods: intubating stylet  Blade: Sana  Blade size: #4  ETT size (mm): 7.5  Cormack-Lehane Classification: grade I - full view of glottis  Measured from: lips  ETT to lips (cm): 22  Number of attempts at approach: 1

## 2024-09-24 NOTE — NURSING NOTE
Assumed care of pt at 1530. Pt just returned from PACU in stable condition. Educated on new orders. Family update provided on NWB status.

## 2024-09-24 NOTE — CARE PLAN
The patient's goals for the shift include sleep     The clinical goals for the shift include pain control      Problem: Pain  Goal: Free from opioid side effects throughout the shift  Outcome: Progressing     Problem: Fall/Injury  Goal: Not fall by end of shift  Outcome: Progressing  Goal: Use assistive devices by end of the shift  Outcome: Progressing  Goal: Be free from injury by end of the shift  Outcome: Progressing  Goal: Verbalize understanding of personal risk factors for fall in the hospital  Outcome: Progressing  Goal: Verbalize understanding of risk factor reduction measures to prevent injury from fall in the home  Outcome: Progressing  Goal: Pace activities to prevent fatigue by end of the shift  Outcome: Progressing     Problem: Pain - Adult  Goal: Verbalizes/displays adequate comfort level or baseline comfort level  Outcome: Progressing     Problem: Safety - Adult  Goal: Free from fall injury  Outcome: Progressing     Problem: Discharge Planning  Goal: Discharge to home or other facility with appropriate resources  Outcome: Progressing     Problem: Chronic Conditions and Co-morbidities  Goal: Patient's chronic conditions and co-morbidity symptoms are monitored and maintained or improved  Outcome: Progressing     Problem: Skin  Goal: Decreased wound size/increased tissue granulation at next dressing change  Outcome: Progressing  Flowsheets (Taken 9/23/2024 2148)  Decreased wound size/increased tissue granulation at next dressing change: Promote sleep for wound healing  Goal: Participates in plan/prevention/treatment measures  Outcome: Progressing  Flowsheets (Taken 9/23/2024 2148)  Participates in plan/prevention/treatment measures: Elevate heels  Goal: Prevent/manage excess moisture  Outcome: Progressing  Flowsheets (Taken 9/23/2024 2148)  Prevent/manage excess moisture: Monitor for/manage infection if present  Goal: Prevent/minimize sheer/friction injuries  Outcome: Progressing  Flowsheets (Taken  9/23/2024 2148)  Prevent/minimize sheer/friction injuries: Use pull sheet  Goal: Promote/optimize nutrition  Outcome: Progressing  Flowsheets (Taken 9/23/2024 2148)  Promote/optimize nutrition: Monitor/record intake including meals  Goal: Promote skin healing  Outcome: Progressing  Flowsheets (Taken 9/23/2024 2148)  Promote skin healing: Turn/reposition every 2 hours/use positioning/transfer devices     Problem: Diabetes  Goal: Achieve decreasing blood glucose levels by end of shift  Outcome: Progressing  Goal: Increase stability of blood glucose readings by end of shift  Outcome: Progressing  Goal: Decrease in ketones present in urine by end of shift  Outcome: Progressing  Goal: Maintain electrolyte levels within acceptable range throughout shift  Outcome: Progressing  Goal: Maintain glucose levels >70mg/dl to <250mg/dl throughout shift  Outcome: Progressing  Goal: No changes in neurological exam by end of shift  Outcome: Progressing  Goal: Learn about and adhere to nutrition recommendations by end of shift  Outcome: Progressing  Goal: Vital signs within normal range for age by end of shift  Outcome: Progressing  Goal: Increase self care and/or family involovement by end of shift  Outcome: Progressing  Goal: Receive DSME education by end of shift  Outcome: Progressing

## 2024-09-24 NOTE — ANESTHESIA PREPROCEDURE EVALUATION
Jorge Barrera is a 68 y.o. male here for:    Open Reduction Internal Fixation Ankle  With Sarthak Ramos MD  Pre-Op Diagnosis Codes:      * Closed fracture of distal end of right tibia, unspecified fracture morphology, initial encounter [S82.301A]    Relevant Problems   Cardiac   (+) HTN (hypertension)   (+) Hyperlipidemia      Pulmonary  Echo 4/2022:  CONCLUSIONS:   1. Poorly visualized anatomical structures due to suboptimal image quality.   2. The left ventricular systolic function is normal with a 55% estimated ejection fraction.   3. Spectral Doppler shows an impaired relaxation pattern of left ventricular diastolic filling.   4. There is mitral stenosis is not seen.   5. Aortic valve stenosis is not present.   6. The pulmonary artery is not well visualized     (+) Chronic obstructive pulmonary disease (Multi)   (+) LACIE (obstructive sleep apnea)      GI   (+) Gastroesophageal reflux disease      /Renal   (+) Chronic renal insufficiency      Endocrine   (+) Diabetes mellitus, type 2 (Multi)       Lab Results   Component Value Date    HGB 12.2 (L) 09/24/2024    HCT 36.7 (L) 09/24/2024    WBC 6.8 09/24/2024     09/24/2024     (L) 09/24/2024    K 5.1 09/24/2024     09/24/2024    CREATININE 1.75 (H) 09/24/2024    BUN 12 09/24/2024       Social History     Tobacco Use   Smoking Status Former    Types: Cigarettes   Smokeless Tobacco Never       No Known Allergies    Current Outpatient Medications   Medication Instructions    atorvastatin (LIPITOR) 40 mg, oral, Daily RT    Breo Ellipta 200-25 mcg/dose inhaler 1 puff, inhalation, Daily    buPROPion SR (WELLBUTRIN SR) 150 mg, oral, 2 times daily    carvedilol (COREG) 6.25 mg, oral, 2 times daily    cholecalciferol (VITAMIN D-3) 1,000 Units, oral, Daily RT    cyanocobalamin (VITAMIN B-12) 1,000 mcg, intramuscular, Every 30 days, Takes on the last day of the month    cyclobenzaprine (Flexeril) 5 mg tablet 1 tablet, oral, Nightly PRN    Fiasp  FlexTouch U-100 Insulin 100 unit/mL (3 mL) injection 4 Units, subcutaneous, 3 times daily (morning, midday, late afternoon), Inject 4 Units subcutaneously daily with breakfast AND 4 Units daily with lunch AND 5 Units daily with dinner. TDD 13 units..    gabapentin (NEURONTIN) 300 mg, oral, 2 times daily    lisinopril 10 mg, oral, Daily    metFORMIN (GLUCOPHAGE) 500 mg, oral, 2 times daily    mirtazapine (REMERON) 15 mg, oral, Nightly    montelukast (SINGULAIR) 10 mg, oral, Nightly    sodium bicarbonate 650 mg, oral, 2 times daily    tamsulosin (FLOMAX) 0.4 mg, oral, Daily RT    zolpidem CR (AMBIEN CR) 12.5 mg, oral, Nightly PRN       No past surgical history on file.    No family history on file.    NPO Details:  No data recorded    Physical Exam    Airway  Mallampati: II  TM distance: >3 FB  Neck ROM: full     Cardiovascular - normal exam     Dental - normal exam     Pulmonary - normal exam     Abdominal            Anesthesia Plan    History of general anesthesia?: yes  History of complications of general anesthesia?: no    ASA 3     regional and general   (LMA 5)  The patient is not a current smoker.    intravenous induction   Postoperative administration of opioids is intended.  Anesthetic plan and risks discussed with patient.

## 2024-09-24 NOTE — PROGRESS NOTES
Occupational Therapy                 Therapy Communication Note    Patient Name: Jorge Barrera  MRN: [id number]  Department: [facility]  Room: [facility]/622-A  Today's Date: 9/24/2024     Discipline: Occupational Therapy    Missed Visit Reason:  pt has ankle fx, requires surgical intervention, please re-order OT post operatively

## 2024-09-25 LAB
ANION GAP SERPL CALC-SCNC: 15 MMOL/L (ref 10–20)
ATRIAL RATE: 69 BPM
BUN SERPL-MCNC: 21 MG/DL (ref 6–23)
CALCIUM SERPL-MCNC: 8.3 MG/DL (ref 8.6–10.3)
CHLORIDE SERPL-SCNC: 104 MMOL/L (ref 98–107)
CO2 SERPL-SCNC: 20 MMOL/L (ref 21–32)
CREAT SERPL-MCNC: 1.87 MG/DL (ref 0.5–1.3)
EGFRCR SERPLBLD CKD-EPI 2021: 39 ML/MIN/1.73M*2
ERYTHROCYTE [DISTWIDTH] IN BLOOD BY AUTOMATED COUNT: 12.1 % (ref 11.5–14.5)
GLUCOSE BLD MANUAL STRIP-MCNC: 250 MG/DL (ref 74–99)
GLUCOSE BLD MANUAL STRIP-MCNC: 276 MG/DL (ref 74–99)
GLUCOSE BLD MANUAL STRIP-MCNC: 380 MG/DL (ref 74–99)
GLUCOSE BLD MANUAL STRIP-MCNC: 386 MG/DL (ref 74–99)
GLUCOSE SERPL-MCNC: 414 MG/DL (ref 74–99)
HCT VFR BLD AUTO: 31.1 % (ref 41–52)
HGB BLD-MCNC: 10.6 G/DL (ref 13.5–17.5)
MCH RBC QN AUTO: 30.5 PG (ref 26–34)
MCHC RBC AUTO-ENTMCNC: 34.1 G/DL (ref 32–36)
MCV RBC AUTO: 90 FL (ref 80–100)
NRBC BLD-RTO: 0 /100 WBCS (ref 0–0)
P AXIS: 64 DEGREES
P OFFSET: 207 MS
P ONSET: 144 MS
PLATELET # BLD AUTO: 132 X10*3/UL (ref 150–450)
POTASSIUM SERPL-SCNC: 4.9 MMOL/L (ref 3.5–5.3)
PR INTERVAL: 162 MS
Q ONSET: 225 MS
QRS COUNT: 11 BEATS
QRS DURATION: 76 MS
QT INTERVAL: 398 MS
QTC CALCULATION(BAZETT): 426 MS
QTC FREDERICIA: 417 MS
R AXIS: 79 DEGREES
RBC # BLD AUTO: 3.47 X10*6/UL (ref 4.5–5.9)
SODIUM SERPL-SCNC: 134 MMOL/L (ref 136–145)
T AXIS: 59 DEGREES
T OFFSET: 424 MS
VENTRICULAR RATE: 69 BPM
WBC # BLD AUTO: 8.9 X10*3/UL (ref 4.4–11.3)

## 2024-09-25 PROCEDURE — 80048 BASIC METABOLIC PNL TOTAL CA: CPT | Performed by: REGISTERED NURSE

## 2024-09-25 PROCEDURE — 97530 THERAPEUTIC ACTIVITIES: CPT | Mod: GP,CQ

## 2024-09-25 PROCEDURE — 1100000001 HC PRIVATE ROOM DAILY

## 2024-09-25 PROCEDURE — 2500000002 HC RX 250 W HCPCS SELF ADMINISTERED DRUGS (ALT 637 FOR MEDICARE OP, ALT 636 FOR OP/ED)

## 2024-09-25 PROCEDURE — 36415 COLL VENOUS BLD VENIPUNCTURE: CPT | Performed by: REGISTERED NURSE

## 2024-09-25 PROCEDURE — 2500000004 HC RX 250 GENERAL PHARMACY W/ HCPCS (ALT 636 FOR OP/ED)

## 2024-09-25 PROCEDURE — 99232 SBSQ HOSP IP/OBS MODERATE 35: CPT | Performed by: REGISTERED NURSE

## 2024-09-25 PROCEDURE — 97161 PT EVAL LOW COMPLEX 20 MIN: CPT | Mod: GP | Performed by: PHYSICAL THERAPIST

## 2024-09-25 PROCEDURE — 85027 COMPLETE CBC AUTOMATED: CPT | Performed by: REGISTERED NURSE

## 2024-09-25 PROCEDURE — 2500000001 HC RX 250 WO HCPCS SELF ADMINISTERED DRUGS (ALT 637 FOR MEDICARE OP)

## 2024-09-25 PROCEDURE — 2500000002 HC RX 250 W HCPCS SELF ADMINISTERED DRUGS (ALT 637 FOR MEDICARE OP, ALT 636 FOR OP/ED): Performed by: STUDENT IN AN ORGANIZED HEALTH CARE EDUCATION/TRAINING PROGRAM

## 2024-09-25 PROCEDURE — 97165 OT EVAL LOW COMPLEX 30 MIN: CPT | Mod: GO

## 2024-09-25 PROCEDURE — 97116 GAIT TRAINING THERAPY: CPT | Mod: GP,CQ

## 2024-09-25 PROCEDURE — 82947 ASSAY GLUCOSE BLOOD QUANT: CPT

## 2024-09-25 RX ORDER — HYDROCODONE BITARTRATE AND ACETAMINOPHEN 5; 325 MG/1; MG/1
1 TABLET ORAL EVERY 6 HOURS PRN
Qty: 28 TABLET | Refills: 0 | Status: SHIPPED | OUTPATIENT
Start: 2024-09-25 | End: 2024-09-26 | Stop reason: HOSPADM

## 2024-09-25 RX ORDER — INSULIN LISPRO 100 [IU]/ML
8 INJECTION, SOLUTION INTRAVENOUS; SUBCUTANEOUS
Status: DISCONTINUED | OUTPATIENT
Start: 2024-09-25 | End: 2024-09-28 | Stop reason: HOSPADM

## 2024-09-25 RX ORDER — INSULIN GLARGINE 100 [IU]/ML
20 INJECTION, SOLUTION SUBCUTANEOUS EVERY 24 HOURS
Status: DISCONTINUED | OUTPATIENT
Start: 2024-09-25 | End: 2024-09-26

## 2024-09-25 ASSESSMENT — COGNITIVE AND FUNCTIONAL STATUS - GENERAL
STANDING UP FROM CHAIR USING ARMS: A LOT
HELP NEEDED FOR BATHING: A LOT
STANDING UP FROM CHAIR USING ARMS: TOTAL
TOILETING: TOTAL
CLIMB 3 TO 5 STEPS WITH RAILING: TOTAL
MOVING FROM LYING ON BACK TO SITTING ON SIDE OF FLAT BED WITH BEDRAILS: A LITTLE
PERSONAL GROOMING: A LOT
MOVING TO AND FROM BED TO CHAIR: A LOT
DRESSING REGULAR UPPER BODY CLOTHING: A LOT
CLIMB 3 TO 5 STEPS WITH RAILING: TOTAL
TURNING FROM BACK TO SIDE WHILE IN FLAT BAD: A LITTLE
CLIMB 3 TO 5 STEPS WITH RAILING: TOTAL
DRESSING REGULAR LOWER BODY CLOTHING: TOTAL
HELP NEEDED FOR BATHING: A LOT
PERSONAL GROOMING: A LOT
TURNING FROM BACK TO SIDE WHILE IN FLAT BAD: A LITTLE
WALKING IN HOSPITAL ROOM: TOTAL
DRESSING REGULAR LOWER BODY CLOTHING: TOTAL
WALKING IN HOSPITAL ROOM: TOTAL
TURNING FROM BACK TO SIDE WHILE IN FLAT BAD: A LITTLE
DAILY ACTIVITIY SCORE: 13
DRESSING REGULAR UPPER BODY CLOTHING: A LITTLE
STANDING UP FROM CHAIR USING ARMS: TOTAL
DAILY ACTIVITIY SCORE: 13
DRESSING REGULAR UPPER BODY CLOTHING: A LOT
MOVING FROM LYING ON BACK TO SITTING ON SIDE OF FLAT BED WITH BEDRAILS: A LITTLE
MOBILITY SCORE: 12
CLIMB 3 TO 5 STEPS WITH RAILING: TOTAL
TOILETING: A LOT
TURNING FROM BACK TO SIDE WHILE IN FLAT BAD: A LITTLE
HELP NEEDED FOR BATHING: A LOT
WALKING IN HOSPITAL ROOM: TOTAL
STANDING UP FROM CHAIR USING ARMS: A LOT
MOVING TO AND FROM BED TO CHAIR: A LOT
MOBILITY SCORE: 12
DRESSING REGULAR LOWER BODY CLOTHING: TOTAL
MOVING TO AND FROM BED TO CHAIR: A LOT
PERSONAL GROOMING: A LOT
MOBILITY SCORE: 11
TOILETING: A LOT
MOVING TO AND FROM BED TO CHAIR: A LOT
MOVING FROM LYING ON BACK TO SITTING ON SIDE OF FLAT BED WITH BEDRAILS: A LITTLE
WALKING IN HOSPITAL ROOM: TOTAL
MOVING FROM LYING ON BACK TO SITTING ON SIDE OF FLAT BED WITH BEDRAILS: A LITTLE
MOBILITY SCORE: 11
DAILY ACTIVITIY SCORE: 13

## 2024-09-25 ASSESSMENT — ACTIVITIES OF DAILY LIVING (ADL): BATHING_ASSISTANCE: MODERATE

## 2024-09-25 ASSESSMENT — PAIN - FUNCTIONAL ASSESSMENT
PAIN_FUNCTIONAL_ASSESSMENT: 0-10
PAIN_FUNCTIONAL_ASSESSMENT: 0-10

## 2024-09-25 ASSESSMENT — PAIN DESCRIPTION - ORIENTATION
ORIENTATION: RIGHT
ORIENTATION: RIGHT

## 2024-09-25 ASSESSMENT — PAIN SCALES - GENERAL
PAINLEVEL_OUTOF10: 8
PAINLEVEL_OUTOF10: 8
PAINLEVEL_OUTOF10: 9
PAINLEVEL_OUTOF10: 8
PAINLEVEL_OUTOF10: 3
PAINLEVEL_OUTOF10: 8

## 2024-09-25 ASSESSMENT — PAIN DESCRIPTION - LOCATION
LOCATION: LEG
LOCATION: LEG

## 2024-09-25 NOTE — PROGRESS NOTES
Occupational Therapy    Evaluation/Treatment    Patient Name: Jorge aBrrera  MRN: [id number]  : 1955  Today's Date: 24  Time Calculation  Start Time: 715  Stop Time: 737  Time Calculation (min): 22 min     622/622-A    Assessment:  End of Session Communication: Bedside nurse  End of Session Patient Position: Up in chair, Alarm on (LE's elevated)  OT Assessment Results: Decreased ADL status, Decreased endurance, Decreased functional mobility    Plan:  Treatment Interventions: ADL retraining, Functional transfer training, Endurance training, Patient/family training  OT Frequency: 2 times per week  OT Discharge Recommendations: Moderate intensity level of continued care  OT Recommended Transfer Status: Maximum assist, Assist of 2  OT - OK to Discharge: Yes  Treatment Interventions: ADL retraining, Functional transfer training, Endurance training, Patient/family training  Subjective     Current Problem:  1. Closed fracture of distal end of right tibia, unspecified fracture morphology, initial encounter  Case Request Operating Room: Open Reduction Internal Fixation Ankle    Case Request Operating Room: Open Reduction Internal Fixation Ankle    FL fluoro images no charge    FL fluoro images no charge    HYDROcodone-acetaminophen (Norco) 5-325 mg tablet    rivaroxaban (Xarelto) 10 mg tablet    CANCELED: Case Request Operating Room: Open Reduction Internal Fixation Ankle    CANCELED: Case Request Operating Room: Open Reduction Internal Fixation Ankle      2. Hyperglycemia        3. Hyperkalemia            General:   OT Received On: 24  General  Reason for Referral: s/p ORIF RIght tibia nailing 24  Referred By: PT/OT 24 Jt CARRANZA/ SHELDON Ramos NWB RLE  Past Medical History Relevant to Rehab: COPD, LACIE  HTN, HLD, Neuropathy, Anxiety, GERD, DM, Hepatitis C, Dysphagia, Parkinsons, Hashimotos  Missed Visit: Yes  Missed Visit Reason:  (pt has ankle fx, requires surgical intervention, please re-order  OT post operatively)  Co-Treatment: PT  Co-Treatment Reason: to maximize patient/staff safety  Patient Position Received: Bed, 3 rail up, Alarm on  General Comment: To ED 9/23/24 wtih fall. CT 9/23/24 Right ankle distal tibial fracture extending into Plafond, tiny avulsion fracture distal lateral malleolus; XR 9/23/24 Right tibia/fibula oblique fracture distal right tibia, displaced fracture proximal right fibula    Precautions:  Hearing/Visual Limitations: [place]  LE Weight Bearing Status:  (NWB R LE)  Medical Precautions: Fall precautions           Pain:  Pain Assessment  0-10 (Numeric) Pain Score: 8  Pain Type: Surgical pain  Pain Location: Ankle  Pain Orientation: Right  Objective     Cognition:  Overall Cognitive Status: Within Functional Limits  Orientation Level: Oriented X4             Home Living:  Home Living Comments: Per patient report resides alone, rents [facility] suite, ramp entry 1 story. Friend lives next door. Walk in shower with seat and grab bar. Normal height toilet with grab bars. No DME    Prior Function:  Prior Function Comments: Per patient report independent in mobilty, ADLs and IADLs without assisitve device. Denies any other falls This one related to slipping on ramp. Drives. Independent in medication managment           Activities of Daily Living:   Eating Assistance: Independent  Grooming Assistance: Minimal  Bathing Assistance: Moderate  UE Dressing Assistance: Minimal  LE Dressing Assistance: Total  Toileting Assistance with Device: Total  Functional Assistance:  (pt max A x 2 to hop 3' from bed to chair with ww, difficulty offloading L LE)                         Activity Tolerance:  Endurance:  (fair, fatigues with attempt to NWB on R LE)           Bed Mobility/Transfers: Bed Mobility  Bed Mobility:  (Supine > sit with HOB 30 degrees CGA, slow transititioning to sit. Denies any dizziness)  Transfers  Transfer:  (Sit > stand at bed level + 2 maximal assist, vc for hand placement.  demonstrates good ability to maintain NWB RLE, Difficulty initiating stand and benefits from use of bed pad to assist. Stand > sit at bed/recliner + 2 moderate assist)                Balance:  Static Sitting: good  Dynamic Sitting: fair   Static Standing: fair -  Dynamic Standing: poor          Sensation:  Light Touch: No apparent deficits    Strength:  Strength Comments: B UE 5/5 throughout            Extremities: RUE   RUE : Within Functional Limits and LUE   LUE: Within Functional Limits    Outcome Measures: [facility] Daily Activity  Putting on and taking off regular lower body clothing: Total  Bathing (including washing, rinsing, drying): A lot  Putting on and taking off regular upper body clothing: A little  Toileting, which includes using toilet, bedpan or urinal: Total  Taking care of personal grooming such as brushing teeth: A lot  Eating Meals: None  Daily Activity - Total Score: 13    Education Documentation  Precautions, taught by Philomena Patel OT at 9/25/2024 11:42 AM.  Learner: Patient  Readiness: Acceptance  Method: Explanation  Response: Verbalizes Understanding, Needs Reinforcement    Home Exercise Program, taught by Philomena Patel OT at 9/25/2024 11:42 AM.  Learner: Patient  Readiness: Acceptance  Method: Explanation  Response: Verbalizes Understanding, Needs Reinforcement    ADL Training, taught by Philomena Patel OT at 9/25/2024 11:42 AM.  Learner: Patient  Readiness: Acceptance  Method: Explanation  Response: Verbalizes Understanding, Needs Reinforcement                 Goals:  Encounter Problems       Encounter Problems (Active)       OT Goals       pt will dress LB with min A (Progressing)       Start:  09/25/24    Expected End:  10/09/24            Pt will transfer to bed, chair, toilet with min A (Progressing)       Start:  09/25/24    Expected End:  10/09/24            pt will complete B UE there ex to increase strength for functional transfers (Progressing)       Start:  09/25/24    Expected  End:  10/09/24            Pt will demo fair + dyn std balance with ADLS (Progressing)       Start:  09/25/24    Expected End:  10/09/24

## 2024-09-25 NOTE — CARE PLAN
The patient's goals for the shift include      Problem: Pain  Goal: Free from opioid side effects throughout the shift  Outcome: Progressing     Problem: Fall/Injury  Goal: Not fall by end of shift  Outcome: Progressing     Problem: Pain - Adult  Goal: Verbalizes/displays adequate comfort level or baseline comfort level  Outcome: Progressing     Problem: Safety - Adult  Goal: Free from fall injury  Outcome: Progressing     Problem: Skin  Goal: Prevent/minimize sheer/friction injuries  Outcome: Progressing  Flowsheets (Taken 9/25/2024 3745)  Prevent/minimize sheer/friction injuries: Use pull sheet     Problem: Diabetes  Goal: Vital signs within normal range for age by end of shift  Outcome: Progressing     The clinical goals for the shift include Patient will remain free from falls and injury throughout shift    Over the shift, the patient remained free from falls and injury throughout shift. Patient complaints of pain managed with PRN medication as prescribed. Patient currently resting with stable vital signs. Patient impulsive and forgetting that he needs assistance to get out of the bed so as to not put weight on his surgical leg. Bed alarm on patient at this time, patient without complaints.

## 2024-09-25 NOTE — PROGRESS NOTES
Progress Note    Subjective:     Post-Operative Day: 1 Status Post right IM tibial nail  Systemic or Specific Complaints:No Complaints    Objective:     Visit Vitals  /77 (BP Location: Right arm, Patient Position: Sitting)   Pulse 103   Temp 37.2 °C (99 °F) (Temporal)   Resp 18       General: alert and oriented, in no acute distress, appears stated age, and cooperative   Wound: no erythema, no edema, no drainage, and dressing and splint CDI   Motion: Painful range of Motion   DVT Exam: No evidence of DVT seen on physical exam.  Negative Haylee's sign.  No significant calf/ankle edema.       Knee swollen but thigh soft to palpation. Moving foot and ankle. Good distal pulses.      Data Review  Recent Results (from the past 24 hour(s))   POCT GLUCOSE    Collection Time: 09/24/24 11:26 AM   Result Value Ref Range    POCT Glucose 145 (H) 74 - 99 mg/dL   POCT GLUCOSE    Collection Time: 09/24/24  4:07 PM   Result Value Ref Range    POCT Glucose 191 (H) 74 - 99 mg/dL   POCT GLUCOSE    Collection Time: 09/24/24  8:14 PM   Result Value Ref Range    POCT Glucose 377 (H) 74 - 99 mg/dL   CBC    Collection Time: 09/25/24  5:59 AM   Result Value Ref Range    WBC 8.9 4.4 - 11.3 x10*3/uL    nRBC 0.0 0.0 - 0.0 /100 WBCs    RBC 3.47 (L) 4.50 - 5.90 x10*6/uL    Hemoglobin 10.6 (L) 13.5 - 17.5 g/dL    Hematocrit 31.1 (L) 41.0 - 52.0 %    MCV 90 80 - 100 fL    MCH 30.5 26.0 - 34.0 pg    MCHC 34.1 32.0 - 36.0 g/dL    RDW 12.1 11.5 - 14.5 %    Platelets 132 (L) 150 - 450 x10*3/uL   Basic Metabolic Panel    Collection Time: 09/25/24  5:59 AM   Result Value Ref Range    Glucose 414 (H) 74 - 99 mg/dL    Sodium 134 (L) 136 - 145 mmol/L    Potassium 4.9 3.5 - 5.3 mmol/L    Chloride 104 98 - 107 mmol/L    Bicarbonate 20 (L) 21 - 32 mmol/L    Anion Gap 15 10 - 20 mmol/L    Urea Nitrogen 21 6 - 23 mg/dL    Creatinine 1.87 (H) 0.50 - 1.30 mg/dL    eGFR 39 (L) >60 mL/min/1.73m*2    Calcium 8.3 (L) 8.6 - 10.3 mg/dL   POCT GLUCOSE     Collection Time: 09/25/24  7:54 AM   Result Value Ref Range    POCT Glucose 380 (H) 74 - 99 mg/dL         Assessment:     Status Post right IM tibial nail. Doing well postoperatively.     Acute postoperative pain: Reports mild to moderate pain to operative extremity.  Exacerbated by movement, relieved by rest ice and pain medication.  Continue current pain management.    No acute events overnight.   Patient reports numbness to right lower extremity. This will improve as block wears off. Will continue to monitor.       Plan:      1: Continues current post-op course :  2:  Continue Deep venous thrombosis prophylaxis: Xarelto 10 mg daily for 28 days   3:  Continue physical therapy: NWB to right lower extremity with use of walker for assistance with ambulation   4:  Continue Pain Control  5.   Discharge planning: TBD. SW and TCC following.     Jeovany Samano, CHRISTIANA-CNP

## 2024-09-25 NOTE — PROGRESS NOTES
09/25/24 1243   Discharge Planning   Home or Post Acute Services Post acute facilities (Rehab/SNF/etc)   Type of Post Acute Facility Services Skilled nursing;Rehab   Expected Discharge Disposition SNF   Does the patient need discharge transport arranged? Yes   RoundTrip coordination needed? Yes   Has discharge transport been arranged? No     Pt is s/p POD #1 ORIF right tibial nailing for right distal tibia fracture on 9/24/24 wit Dr. Sarthak Ramos. [facility] scores are PT (12) OT (13) recommending continued therapy at moderate-high level/intensity. Pt has history of Parkinson's. Per Orthopedics, pt is non weight bearing to right lower extremity with use of walker for assistance with ambulation, pt will DC on Xarelto PO x 28 days for DVT prophylaxis. Pt in agreement to IP rehab and prefers SNF, facility of choice is #1 [facility] and #2 [facility]. ADOD 9/26/24. JAMILA Trujillo notified. CT team will monitor case for progression and DC planning.

## 2024-09-25 NOTE — PROGRESS NOTES
Jorge Barrera is a 68 y.o. male on day 2 of admission presenting with Closed fracture of right distal tibia.      Subjective   Patient examined and seen. Alert and oriented x3, resting comfortably in chair.  Patient denies chest pain, shortness of breath, palpitations, abdominal pain, fever or chills.  He is agreeable to SNF. AMPAC scores 12.        Objective     Last Recorded Vitals  /77 (BP Location: Right arm, Patient Position: Sitting)   Pulse 103   Temp 37.2 °C (99 °F) (Temporal)   Resp 18   Wt 72.6 kg (160 lb)   SpO2 93%   Intake/Output last 3 Shifts:    Intake/Output Summary (Last 24 hours) at 9/25/2024 1335  Last data filed at 9/25/2024 1020  Gross per 24 hour   Intake 2321.67 ml   Output 500 ml   Net 1821.67 ml       Admission Weight  Weight: 72.6 kg (160 lb) (09/23/24 1014)    Daily Weight  09/23/24 : 72.6 kg (160 lb)    Image Results  FL fluoro images no charge  These images are not reportable by radiology and will not be interpreted   by  Radiologists.      Physical Exam  Constitutional: Well developed, awake/alert/oriented x3, , cooperative + hx of dysphagia, forgetful at times   Respiratory/Thorax: Patent airways,  normal breath sounds with good chest expansion, thorax symmetric  Cardiovascular: Regular, rate and rhythm,   normal S 1and S 2  Musculoskeletal:  Right lower extremity in splint, toes are warm and pink, capillary refills < 2   Extremities: normal extremities,  no contusions or wounds seen  except as noted above   Skin: warm, dry, intact  Neurological: alert/oriented x 3, speech clear,   Psychiatric: appropriate mood and behavior    Relevant Results  Scheduled medications  atorvastatin, 40 mg, oral, Nightly  buPROPion SR, 150 mg, oral, BID  carvedilol, 6.25 mg, oral, BID  cholecalciferol, 1,000 Units, oral, Daily  fluticasone furoate-vilanteroL, 1 puff, inhalation, Daily  gabapentin, 300 mg, oral, BID  insulin lispro, 0-10 Units, subcutaneous, Before meals & nightly  [Held by  provider] lisinopril, 10 mg, oral, Daily  [Held by provider] metFORMIN, 500 mg, oral, Daily with breakfast  mirtazapine, 15 mg, oral, Nightly  montelukast, 10 mg, oral, Nightly  polyethylene glycol, 17 g, oral, Daily  rivaroxaban, 10 mg, oral, Daily  [Held by provider] sodium bicarbonate, 650 mg, oral, BID  tamsulosin, 0.4 mg, oral, Daily  zolpidem, 10 mg, oral, Nightly      Continuous medications  sodium chloride 0.9%, 100 mL/hr, Last Rate: 100 mL/hr (09/25/24 0653)      PRN medications  PRN medications: acetaminophen **OR** acetaminophen **OR** acetaminophen, HYDROcodone-acetaminophen, melatonin, naloxone, ondansetron **OR** ondansetron, promethazine    Results for orders placed or performed during the hospital encounter of 09/23/24 (from the past 24 hour(s))   POCT GLUCOSE   Result Value Ref Range    POCT Glucose 191 (H) 74 - 99 mg/dL   POCT GLUCOSE   Result Value Ref Range    POCT Glucose 377 (H) 74 - 99 mg/dL   CBC   Result Value Ref Range    WBC 8.9 4.4 - 11.3 x10*3/uL    nRBC 0.0 0.0 - 0.0 /100 WBCs    RBC 3.47 (L) 4.50 - 5.90 x10*6/uL    Hemoglobin 10.6 (L) 13.5 - 17.5 g/dL    Hematocrit 31.1 (L) 41.0 - 52.0 %    MCV 90 80 - 100 fL    MCH 30.5 26.0 - 34.0 pg    MCHC 34.1 32.0 - 36.0 g/dL    RDW 12.1 11.5 - 14.5 %    Platelets 132 (L) 150 - 450 x10*3/uL   Basic Metabolic Panel   Result Value Ref Range    Glucose 414 (H) 74 - 99 mg/dL    Sodium 134 (L) 136 - 145 mmol/L    Potassium 4.9 3.5 - 5.3 mmol/L    Chloride 104 98 - 107 mmol/L    Bicarbonate 20 (L) 21 - 32 mmol/L    Anion Gap 15 10 - 20 mmol/L    Urea Nitrogen 21 6 - 23 mg/dL    Creatinine 1.87 (H) 0.50 - 1.30 mg/dL    eGFR 39 (L) >60 mL/min/1.73m*2    Calcium 8.3 (L) 8.6 - 10.3 mg/dL   POCT GLUCOSE   Result Value Ref Range    POCT Glucose 380 (H) 74 - 99 mg/dL   POCT GLUCOSE   Result Value Ref Range    POCT Glucose 386 (H) 74 - 99 mg/dL          Assessment/Plan      # Oblique fracture of the distal shaft of the right tibia.   Displaced fracture of the  proximal right fibula.      Admit to Hospital Team   Orthopedics  team Consulted for surgery in AM   DVTp and Pain management per Ortho   PT/OT evaluation  and treatment   CBC/BMP as appropriate, review results  Pulmonary Toileting   Follow up as needed outpatient with Orthopedics at discharge         # Diabetes Mellitus Type 2 Insulin Dependent  - Hyperglcemia   Continue home medications  Diet Cardiac/Diabetic  Continue Sliding Scale SSI AC/HS   A1C 6.8 from 8/2024 - repeat in process   Encourage healthy lifestyle changes  A1C increased to 8.4   Add lantus and review      # HTN/ HLD  Hemodynamically stable  Telemetry for arrhythmia monitoring  - reviewed  Continue Statin  Continue Coreg  Hold Lisinopril until post op labs reviewed   Labs daily      # BPH  Continue home mediations      # CKD Stage 3b  Avoid nephrotoxic agents  EMR indicates baseline 1.95   Today 1.75   Hold Lisinopril due to CKD and post op  Reviewed 1.87      # Hyperkalemia  Repeat potassium   4.6  Monitor      # COPD  , not in exacerbation   Continue bronchodilators   Encourage ambulation   Wean oxygen to keep sats above 92%   Pulmonary Toileting           FULL CODE  Disposition: 24 hours   Possible Rehab Placement will be needed  DVTp: Xeralto       Time spent  36 minutes obtaining labs, imaging, recommendations, interview, assessment, examination, medication review/ordering, and EMR review.     Plan of care was discussed extensively with patient, RN and Primary attending Dr. Celaya. Patient verbalized understanding through teach back method. All questions and concerns addressed upon examination.      Of note, this documentation is completed using the Dragon Dictation system (voice recognition software). There may be spelling and/or grammatical errors that were not corrected prior to final submission.     Mery Dyer, APRN-CNP

## 2024-09-25 NOTE — PROGRESS NOTES
Physical Therapy    Physical Therapy Evaluation    Patient Name: Jorge Barrera  MRN: [id number]  Department: [facility]  Room: [address] [place] [address]  Today's Date: 9/25/2024   Time Calculation  Start Time: 0714  Stop Time: 0740  Time Calculation (min): 26 min    Assessment/Plan   PT Assessment  PT Assessment Results: Decreased strength, Decreased endurance, Impaired balance, Decreased mobility, Impaired judgement, Decreased safety awareness  Rehab Prognosis: Good  Barriers to Discharge: Resides alone, NWB status RLE  Evaluation/Treatment Tolerance: Patient limited by fatigue  End of Session Communication: Bedside nurse  Assessment Comment:  (Patent will benefit from furrther PT to increase mobity, educate further on NWB and use of appropriate aides, Progress mobilty)  End of Session Patient Position: Up in chair, Alarm on (LEs elevated)  IP OR SWING BED PT PLAN  Inpatient or Swing Bed: Inpatient  PT Plan  Treatment/Interventions: Bed mobility, Transfer training, Gait training, Balance training, Strengthening, Therapeutic exercise, Therapeutic activity, Wheelchair management  PT Frequency: Daily  PT Discharge Recommendations:  (Continued PT at moderate to high intensity and high frequency in 24/7 setting)  Equipment Recommended upon Discharge:  (TBD)  PT Recommended Transfer Status: Assist x2  PT - OK to Discharge:  (once deemed medically appropriate with continued PT in 24/7 setting.)      Subjective   General Visit Information:  General  Reason for Referral: s/p ORIF RIght tiia aniling 9/23/24  Referred By: PT/OT 9/23/24 Jt CARRANZA/ SHELDON Ramos NWB RLE  Past Medical History Relevant to Rehab: copd, LACIE  HTN, HLD, Neuropathy, Anxiety, GERD, DM, Hepatitis C, Dysphagia, Parkinsons, Hshimotos  Co-Treatment: OT  Co-Treatment Reason: to maximize patient/staff safety  Patient Position Received: Bed, 3 rail up, Alarm on  General Comment: To eD 9/23/24 wtih fall. CT 9/23/24 Right ankle leigh ann distal titbial fracture extending into Plafond, tiny  avulsion fracture distal lateral malleolus; XR 9/23/24 Right tibia/fibula oblique fracture distal right tibia, displaced fracture proximal right  fibula  Home Living:  Home Living  Home Living Comments: Per patient report resides alone, rents [facility] suite, ramp entry 1 story. Friend lives next door. Walk in shower with seat and grab bar. Normal height toilet with grab bars. No DME  Prior Level of Function:  Prior Function Per Pt/Caregiver Report  Prior Function Comments: Per patient report independent in mobilty, ADLs and IADLs without assisitve device. Denies any other falls This one related to slipping on ramp. Drives. Independent in medicaiton managment  Precautions:  Precautions  LE Weight Bearing Status:  (NWB RLE)  Medical Precautions: Fall precautions      Objective   Pain:  Pain Assessment  0-10 (Numeric) Pain Score: 8  Pain Type: Surgical pain  Pain Location: Ankle  Pain Orientation: Right  Cognition:  Cognition  Overall Cognitive Status: Within Functional Limits    General Assessments:  General Observation  General Observation: Patient lying in bed with RLE elevated on pillow. Agreeable to PT/OT. Short leg gutter splint with acebandage in place RLE       Activity Tolerance  Endurance:  (Fair)    Static Sitting Balance  Static Sitting- Good  Dynamic Sitting Balance  Dynamic Sitting- Fair +    Static Standing Balance  Static Standing-: Fair  Dynamic Standing Balance  Dynamic Standing- Poor  Functional Assessments:  Bed Mobility  Bed Mobility:  (Supine > sit with HOB 30 degrees CGA, slow transititioning to sit. Denies any dizziness)    Transfers  Transfer:  (Sit > stand at bed level + 2 maximal assist, vc for hand placement. demonstrates good ability to maintain NWB RLE, Difficulty initiating stand and benefits from use of bed pad to assist. Stand > sit at bed/recliner + 2 moderate assist)    Ambulation/Gait Training  Ambulation/Gait Training Performed:  (Patient has increased difficulty using BUE to  "support body weight to attempt \"hop\". using ww + 2 maximal assist. Able to take 3 small hops wtih poor foot clearance. Note increased fatigue and tendency to rest toes of RLE on floor. Had attempted hop  to right with no success. Able to move to left + 2 maximal assist   Extremity/Trunk Assessments:  RUE   RUE : Within Functional Limits  LUE   LUE: Within Functional Limits  RLE   RLE :  (AROM Hip/knee WFL, ankle not tested due to splint and recent ORIF, MMT hip 3+/5)  LLE   LLE :  (AROM Hip/knee/ankle WFL, MMT 5/5 grossly)  Outcome Measures:  [facility] Basic Mobility  Turning from your back to your side while in a flat bed without using bedrails: A little  Moving from lying on your back to sitting on the side of a flat bed without using bedrails: A little  Moving to and from bed to chair (including a wheelchair): A lot  Standing up from a chair using your arms (e.g. wheelchair or bedside chair): A lot  To walk in hospital room: Total  Climbing 3-5 steps with railing: Total  Basic Mobility - Total Score: 12    Encounter Problems       Encounter Problems (Active)       PT Problem       Bed mobility supine <>sit independent  (Progressing)       Start:  09/25/24    Expected End:  10/09/24            Transfers sit <> stand /bed<> chair with ww + 1 minimal assist (Progressing)       Start:  09/25/24    Expected End:  10/09/24            Patient to maneuver wheelchair> 50' to include negotiation of doorways and objects, engaging locks/unlocking supervised.  (Not Progressing)       Start:  09/25/24    Expected End:  10/09/24               Pain - Adult              Education Documentation  Precautions, taught by Melissa Arvizu, PT at 9/25/2024  8:04 AM.  Learner: Patient  Readiness: Acceptance  Method: Explanation, Demonstration  Response: Needs Reinforcement    Mobility Training, taught by Melissa Arvizu PT at 9/25/2024  8:04 AM.  Learner: Patient  Readiness: Acceptance  Method: Explanation, Demonstration  Response: Needs " Reinforcement

## 2024-09-25 NOTE — PROGRESS NOTES
"   09/25/24 1500   Discharge Planning   Home or Post Acute Services In home services   Type of Home Care Services Home OT;Home PT;Home health aide   Expected Discharge Disposition Home H   Does the patient need discharge transport arranged? Yes   RoundTrip coordination needed? Yes   Has discharge transport been arranged? No   Patient Choice   Provider Choice list and CMS website (https://medicare.gov/care-compare#search) for post-acute Quality and Resource Measure Data were provided and reviewed with: Patient   Patient / Family choosing to utilize agency / facility established prior to hospitalization No     Per pt RN, pt and pt's friends/caretaker are now requesting DC home with [facility]. SW met with pt and pt's friends at bedside and introduced self and role. Pt reports that he prefers to go home as he does not feel like he would get proper care at SNF. Pt's friends explain that he has all of the equipment at home that he would need including wheelchair accessibility and an electric scooter. SW educated pt on risks of not having 24-7 care. Pt's friend reports that he lives in an \"in-law suite\" and has access to support when needed. Pt wishes for [facility] for PT/OT and assistance with bathing. SW explained freedom of choice and pt agreeable to [facility]. ADOD tomorrow. Care transitions to follow.   "

## 2024-09-25 NOTE — DISCHARGE INSTRUCTIONS
Non weight bearing to right lower extremity   Keep splint and dressing in place until one week follow up with orthopedic surgeon  Xarelto 10 mg once daily for 28 days for DVT prophylaxis     Thank you for choosing [facility]. It has been a pleasure taking part in your medical care. Please follow up with your primary care provider as instructed. If your symptoms should persist or worsen, please contact your primary care physician, or in the case of an emergency proceed to the nearest Emergency Room for further care. If you have any questions about the care you received, please call [facility] at [phone]. Thank you again! TERE Ordonez  *Please note the information above is to be used as a guide, follow up with your PCP for specific information related to your needs *    #############  Please make sure that you are keeping a log of your insulin and glucose readings

## 2024-09-25 NOTE — PROGRESS NOTES
Physical Therapy    Physical Therapy    Physical Therapy Treatment    Patient Name: Jorge Barrera  MRN: [id number]  Today's Date: 9/25/2024  Time Calculation  Start Time: 1325  Stop Time: 1350  Time Calculation (min): 25 min       Assessment/Plan   PT Assessment  PT Assessment Results: Decreased strength, Decreased endurance, Impaired balance, Decreased mobility, Impaired judgement, Decreased safety awareness  Rehab Prognosis: Good  Barriers to Discharge: Resides alone, NWB status RLE  Evaluation/Treatment Tolerance: Patient limited by fatigue  End of Session Communication: Bedside nurse  Assessment Comment:  (Patent will benefit from furrther PT to increase mobity, educate further on NWB and use of appropriate aides, Progress mobilty)  End of Session Patient Position: Up in chair, Alarm on     PT Plan  Treatment/Interventions: Bed mobility, Transfer training, Gait training, Balance training, Strengthening, Therapeutic exercise, Therapeutic activity, Wheelchair management  PT Frequency: Daily  PT Discharge Recommendations:  (Continued PT at moderate to high intensity and high frequency in 24/7 setting)  Equipment Recommended upon Discharge:  (TBD)  PT Recommended Transfer Status: Assist x2      Current Problem:  1. Closed fracture of distal end of right tibia, unspecified fracture morphology, initial encounter  Case Request Operating Room: Open Reduction Internal Fixation Ankle    Case Request Operating Room: Open Reduction Internal Fixation Ankle    FL fluoro images no charge    FL fluoro images no charge    HYDROcodone-acetaminophen (Norco) 5-325 mg tablet    rivaroxaban (Xarelto) 10 mg tablet    CANCELED: Case Request Operating Room: Open Reduction Internal Fixation Ankle    CANCELED: Case Request Operating Room: Open Reduction Internal Fixation Ankle      2. Hyperglycemia        3. Hyperkalemia            General Visit Information:   PT  Visit  PT Received On: 09/25/24  General  Reason for Referral: s/p ORIF RIght  tibia nailing 9/23/24  Prior to Session Communication: Bedside nurse  Patient Position Received: Up in chair, Alarm on  Preferred Learning Style: verbal, visual  General Comment:  (Pt sitting up in recliner.)  Subjective     Precautions:  Precautions  LE Weight Bearing Status: Right Non-Weight Bearing  Medical Precautions: Fall precautions  Precautions Comment: Pt instructed with NWB on RLE with transfers/ standing and ambulating using a WW.         Objective     Pain:  Pain Assessment  Pain Assessment: 0-10  0-10 (Numeric) Pain Score: 8  Pain Type: Surgical pain  Pain Location: Leg  Pain Orientation: Right                        Treatments:                 Transfers  Transfer: Yes  Transfer 1  Technique 1: Sit to stand, Stand to sit  Transfer Device 1: Walker  Transfer Level of Assistance 1: Moderate assistance  Trials/Comments 1: Pt maintains NWB through RLE with transfers/ standing. Attempts to hop in place with difficulty and lack of understanding task after visual demonstration. (Pt transfers with WW  NWB RLE with mod A x1 with v/c for proper technique/ hand placement.   Pt demonstrates lack of understanding instructions.  Trials performed x4 at recliner with using WW.  Pt tends to lean heavily to the R side.)          Outcome Measures:  [facility] Basic Mobility  Turning from your back to your side while in a flat bed without using bedrails: A little  Moving from lying on your back to sitting on the side of a flat bed without using bedrails: A little  Moving to and from bed to chair (including a wheelchair): A lot  Standing up from a chair using your arms (e.g. wheelchair or bedside chair): A lot  To walk in hospital room: Total  Climbing 3-5 steps with railing: Total  Basic Mobility - Total Score: 12  Education Documentation  No documentation found.  Education Comments  No comments found.        EDUCATION:    Individual(s) Educated: Patient  Education Provided: Fall Risk, Home Exercise Program, Home Safety,  Post-Op Precautions  Encounter Problems       Encounter Problems (Active)       PT Problem       Bed mobility supine <>sit independent  (Progressing)       Start:  09/25/24    Expected End:  10/09/24            Transfers sit <> stand /bed<> chair with ww + 1 minimal assist (Progressing)       Start:  09/25/24    Expected End:  10/09/24            Patient to maneuver wheelchair> 50' to include negotiation of doorways and objects, engaging locks/unlocking supervised.  (Not Progressing)       Start:  09/25/24    Expected End:  10/09/24               Pain - Adult

## 2024-09-26 ENCOUNTER — PHARMACY VISIT (OUTPATIENT)
Dept: PHARMACY | Facility: CLINIC | Age: 69
End: 2024-09-26
Payer: MEDICARE

## 2024-09-26 LAB
ANION GAP SERPL CALC-SCNC: 12 MMOL/L (ref 10–20)
BUN SERPL-MCNC: 16 MG/DL (ref 6–23)
CALCIUM SERPL-MCNC: 8.5 MG/DL (ref 8.6–10.3)
CHLORIDE SERPL-SCNC: 109 MMOL/L (ref 98–107)
CO2 SERPL-SCNC: 23 MMOL/L (ref 21–32)
CREAT SERPL-MCNC: 1.73 MG/DL (ref 0.5–1.3)
EGFRCR SERPLBLD CKD-EPI 2021: 43 ML/MIN/1.73M*2
ERYTHROCYTE [DISTWIDTH] IN BLOOD BY AUTOMATED COUNT: 12.6 % (ref 11.5–14.5)
GLUCOSE BLD MANUAL STRIP-MCNC: 164 MG/DL (ref 74–99)
GLUCOSE BLD MANUAL STRIP-MCNC: 170 MG/DL (ref 74–99)
GLUCOSE BLD MANUAL STRIP-MCNC: 238 MG/DL (ref 74–99)
GLUCOSE BLD MANUAL STRIP-MCNC: 98 MG/DL (ref 74–99)
GLUCOSE SERPL-MCNC: 182 MG/DL (ref 74–99)
HCT VFR BLD AUTO: 31.2 % (ref 41–52)
HGB BLD-MCNC: 10.4 G/DL (ref 13.5–17.5)
MAGNESIUM SERPL-MCNC: 1.71 MG/DL (ref 1.6–2.4)
MCH RBC QN AUTO: 30.5 PG (ref 26–34)
MCHC RBC AUTO-ENTMCNC: 33.3 G/DL (ref 32–36)
MCV RBC AUTO: 92 FL (ref 80–100)
NRBC BLD-RTO: 0 /100 WBCS (ref 0–0)
PLATELET # BLD AUTO: 136 X10*3/UL (ref 150–450)
POTASSIUM SERPL-SCNC: 4 MMOL/L (ref 3.5–5.3)
RBC # BLD AUTO: 3.41 X10*6/UL (ref 4.5–5.9)
SODIUM SERPL-SCNC: 140 MMOL/L (ref 136–145)
WBC # BLD AUTO: 8.3 X10*3/UL (ref 4.4–11.3)

## 2024-09-26 PROCEDURE — 2500000002 HC RX 250 W HCPCS SELF ADMINISTERED DRUGS (ALT 637 FOR MEDICARE OP, ALT 636 FOR OP/ED)

## 2024-09-26 PROCEDURE — 2500000002 HC RX 250 W HCPCS SELF ADMINISTERED DRUGS (ALT 637 FOR MEDICARE OP, ALT 636 FOR OP/ED): Performed by: REGISTERED NURSE

## 2024-09-26 PROCEDURE — 1100000001 HC PRIVATE ROOM DAILY

## 2024-09-26 PROCEDURE — 2500000001 HC RX 250 WO HCPCS SELF ADMINISTERED DRUGS (ALT 637 FOR MEDICARE OP): Performed by: REGISTERED NURSE

## 2024-09-26 PROCEDURE — 83735 ASSAY OF MAGNESIUM: CPT | Performed by: REGISTERED NURSE

## 2024-09-26 PROCEDURE — 2500000001 HC RX 250 WO HCPCS SELF ADMINISTERED DRUGS (ALT 637 FOR MEDICARE OP)

## 2024-09-26 PROCEDURE — 85027 COMPLETE CBC AUTOMATED: CPT | Performed by: REGISTERED NURSE

## 2024-09-26 PROCEDURE — 82947 ASSAY GLUCOSE BLOOD QUANT: CPT

## 2024-09-26 PROCEDURE — 97530 THERAPEUTIC ACTIVITIES: CPT | Mod: GO

## 2024-09-26 PROCEDURE — 2500000004 HC RX 250 GENERAL PHARMACY W/ HCPCS (ALT 636 FOR OP/ED)

## 2024-09-26 PROCEDURE — 97530 THERAPEUTIC ACTIVITIES: CPT | Mod: GP,CQ

## 2024-09-26 PROCEDURE — 97535 SELF CARE MNGMENT TRAINING: CPT | Mod: GO

## 2024-09-26 PROCEDURE — 99232 SBSQ HOSP IP/OBS MODERATE 35: CPT | Performed by: REGISTERED NURSE

## 2024-09-26 PROCEDURE — 36415 COLL VENOUS BLD VENIPUNCTURE: CPT | Performed by: REGISTERED NURSE

## 2024-09-26 PROCEDURE — 2500000004 HC RX 250 GENERAL PHARMACY W/ HCPCS (ALT 636 FOR OP/ED): Performed by: NURSE PRACTITIONER

## 2024-09-26 PROCEDURE — 80048 BASIC METABOLIC PNL TOTAL CA: CPT | Performed by: REGISTERED NURSE

## 2024-09-26 PROCEDURE — 2500000002 HC RX 250 W HCPCS SELF ADMINISTERED DRUGS (ALT 637 FOR MEDICARE OP, ALT 636 FOR OP/ED): Performed by: STUDENT IN AN ORGANIZED HEALTH CARE EDUCATION/TRAINING PROGRAM

## 2024-09-26 RX ORDER — METFORMIN HYDROCHLORIDE 500 MG/1
500 TABLET ORAL
Status: DISCONTINUED | OUTPATIENT
Start: 2024-09-26 | End: 2024-09-28 | Stop reason: HOSPADM

## 2024-09-26 RX ORDER — INSULIN GLARGINE 100 [IU]/ML
10 INJECTION, SOLUTION SUBCUTANEOUS EVERY 24 HOURS
Status: DISCONTINUED | OUTPATIENT
Start: 2024-09-26 | End: 2024-09-28

## 2024-09-26 RX ORDER — KETOROLAC TROMETHAMINE 30 MG/ML
15 INJECTION, SOLUTION INTRAMUSCULAR; INTRAVENOUS ONCE
Status: COMPLETED | OUTPATIENT
Start: 2024-09-26 | End: 2024-09-26

## 2024-09-26 RX ORDER — PEN NEEDLE, DIABETIC 30 GX3/16"
NEEDLE, DISPOSABLE MISCELLANEOUS
Qty: 100 EACH | Refills: 1 | Status: SHIPPED | OUTPATIENT
Start: 2024-09-26

## 2024-09-26 RX ORDER — INSULIN ASPART INJECTION 100 [IU]/ML
6 INJECTION, SOLUTION SUBCUTANEOUS
Qty: 15 ML | Refills: 1 | Status: SHIPPED | OUTPATIENT
Start: 2024-09-26 | End: 2024-11-25

## 2024-09-26 RX ORDER — HYDROCODONE BITARTRATE AND ACETAMINOPHEN 5; 325 MG/1; MG/1
1 TABLET ORAL EVERY 6 HOURS PRN
Qty: 28 TABLET | Refills: 0 | Status: SHIPPED | OUTPATIENT
Start: 2024-09-26 | End: 2024-10-03

## 2024-09-26 RX ORDER — IBUPROFEN 200 MG
8 TABLET ORAL AS NEEDED
Qty: 10 TABLET | Refills: 1 | Status: SHIPPED | OUTPATIENT
Start: 2024-09-26 | End: 2024-09-29

## 2024-09-26 RX ORDER — DOCUSATE SODIUM 100 MG/1
100 CAPSULE, LIQUID FILLED ORAL 2 TIMES DAILY
Qty: 28 CAPSULE | Refills: 0 | Status: SHIPPED | OUTPATIENT
Start: 2024-09-26 | End: 2024-10-10

## 2024-09-26 RX ORDER — ACETAMINOPHEN 325 MG/1
650 TABLET ORAL EVERY 4 HOURS PRN
Start: 2024-09-26

## 2024-09-26 RX ORDER — INSULIN GLARGINE 100 [IU]/ML
10 INJECTION, SOLUTION SUBCUTANEOUS NIGHTLY
Qty: 15 ML | Refills: 0 | Status: SHIPPED | OUTPATIENT
Start: 2024-09-26 | End: 2024-09-28 | Stop reason: HOSPADM

## 2024-09-26 RX ORDER — NALOXONE HYDROCHLORIDE 4 MG/.1ML
4 SPRAY NASAL AS NEEDED
Qty: 2 EACH | Refills: 11 | Status: SHIPPED | OUTPATIENT
Start: 2024-09-26

## 2024-09-26 RX ORDER — POLYETHYLENE GLYCOL 3350 17 G/17G
17 POWDER, FOR SOLUTION ORAL DAILY
Qty: 7 PACKET | Refills: 0 | Status: SHIPPED | OUTPATIENT
Start: 2024-09-27 | End: 2024-10-04

## 2024-09-26 RX ORDER — LISINOPRIL 10 MG/1
10 TABLET ORAL DAILY
Status: DISCONTINUED | OUTPATIENT
Start: 2024-09-26 | End: 2024-09-28 | Stop reason: HOSPADM

## 2024-09-26 SDOH — ECONOMIC STABILITY: FOOD INSECURITY: WITHIN THE PAST 12 MONTHS, THE FOOD YOU BOUGHT JUST DIDN'T LAST AND YOU DIDN'T HAVE MONEY TO GET MORE.: NEVER TRUE

## 2024-09-26 SDOH — SOCIAL STABILITY: SOCIAL INSECURITY
WITHIN THE LAST YEAR, HAVE YOU BEEN KICKED, HIT, SLAPPED, OR OTHERWISE PHYSICALLY HURT BY YOUR PARTNER OR EX-PARTNER?: NO

## 2024-09-26 SDOH — SOCIAL STABILITY: SOCIAL INSECURITY: WITHIN THE LAST YEAR, HAVE YOU BEEN AFRAID OF YOUR PARTNER OR EX-PARTNER?: NO

## 2024-09-26 SDOH — ECONOMIC STABILITY: INCOME INSECURITY: IN THE PAST 12 MONTHS, HAS THE ELECTRIC, GAS, OIL, OR WATER COMPANY THREATENED TO SHUT OFF SERVICE IN YOUR HOME?: NO

## 2024-09-26 SDOH — ECONOMIC STABILITY: TRANSPORTATION INSECURITY
IN THE PAST 12 MONTHS, HAS LACK OF TRANSPORTATION KEPT YOU FROM MEETINGS, WORK, OR FROM GETTING THINGS NEEDED FOR DAILY LIVING?: NO

## 2024-09-26 SDOH — SOCIAL STABILITY: SOCIAL INSECURITY
WITHIN THE LAST YEAR, HAVE TO BEEN RAPED OR FORCED TO HAVE ANY KIND OF SEXUAL ACTIVITY BY YOUR PARTNER OR EX-PARTNER?: NO

## 2024-09-26 SDOH — ECONOMIC STABILITY: INCOME INSECURITY: HOW HARD IS IT FOR YOU TO PAY FOR THE VERY BASICS LIKE FOOD, HOUSING, MEDICAL CARE, AND HEATING?: NOT HARD AT ALL

## 2024-09-26 SDOH — ECONOMIC STABILITY: FOOD INSECURITY: WITHIN THE PAST 12 MONTHS, YOU WORRIED THAT YOUR FOOD WOULD RUN OUT BEFORE YOU GOT MONEY TO BUY MORE.: NEVER TRUE

## 2024-09-26 SDOH — ECONOMIC STABILITY: HOUSING INSECURITY: AT ANY TIME IN THE PAST 12 MONTHS, WERE YOU HOMELESS OR LIVING IN A SHELTER (INCLUDING NOW)?: NO

## 2024-09-26 SDOH — SOCIAL STABILITY: SOCIAL INSECURITY: WITHIN THE LAST YEAR, HAVE YOU BEEN HUMILIATED OR EMOTIONALLY ABUSED IN OTHER WAYS BY YOUR PARTNER OR EX-PARTNER?: NO

## 2024-09-26 SDOH — ECONOMIC STABILITY: INCOME INSECURITY: IN THE LAST 12 MONTHS, WAS THERE A TIME WHEN YOU WERE NOT ABLE TO PAY THE MORTGAGE OR RENT ON TIME?: NO

## 2024-09-26 SDOH — ECONOMIC STABILITY: HOUSING INSECURITY: IN THE PAST 12 MONTHS, HOW MANY TIMES HAVE YOU MOVED WHERE YOU WERE LIVING?: 0

## 2024-09-26 SDOH — ECONOMIC STABILITY: TRANSPORTATION INSECURITY
IN THE PAST 12 MONTHS, HAS THE LACK OF TRANSPORTATION KEPT YOU FROM MEDICAL APPOINTMENTS OR FROM GETTING MEDICATIONS?: NO

## 2024-09-26 ASSESSMENT — COGNITIVE AND FUNCTIONAL STATUS - GENERAL
DRESSING REGULAR LOWER BODY CLOTHING: A LITTLE
CLIMB 3 TO 5 STEPS WITH RAILING: TOTAL
TURNING FROM BACK TO SIDE WHILE IN FLAT BAD: A LITTLE
STANDING UP FROM CHAIR USING ARMS: A LOT
MOVING FROM LYING ON BACK TO SITTING ON SIDE OF FLAT BED WITH BEDRAILS: A LITTLE
TURNING FROM BACK TO SIDE WHILE IN FLAT BAD: A LITTLE
TOILETING: A LITTLE
TOILETING: A LOT
HELP NEEDED FOR BATHING: A LITTLE
STANDING UP FROM CHAIR USING ARMS: A LOT
WALKING IN HOSPITAL ROOM: A LOT
MOVING FROM LYING ON BACK TO SITTING ON SIDE OF FLAT BED WITH BEDRAILS: A LITTLE
HELP NEEDED FOR BATHING: A LOT
PERSONAL GROOMING: A LOT
MOVING TO AND FROM BED TO CHAIR: A LOT
DRESSING REGULAR UPPER BODY CLOTHING: A LITTLE
MOBILITY SCORE: 13
WALKING IN HOSPITAL ROOM: TOTAL
MOBILITY SCORE: 12
DRESSING REGULAR LOWER BODY CLOTHING: TOTAL
CLIMB 3 TO 5 STEPS WITH RAILING: TOTAL
DAILY ACTIVITIY SCORE: 21
DAILY ACTIVITIY SCORE: 14
MOVING TO AND FROM BED TO CHAIR: A LOT

## 2024-09-26 ASSESSMENT — PAIN SCALES - GENERAL
PAINLEVEL_OUTOF10: 5 - MODERATE PAIN
PAINLEVEL_OUTOF10: 3
PAINLEVEL_OUTOF10: 6
PAINLEVEL_OUTOF10: 8
PAINLEVEL_OUTOF10: 7
PAINLEVEL_OUTOF10: 3
PAINLEVEL_OUTOF10: 6
PAINLEVEL_OUTOF10: 9
PAINLEVEL_OUTOF10: 3
PAINLEVEL_OUTOF10: 4

## 2024-09-26 ASSESSMENT — PAIN - FUNCTIONAL ASSESSMENT
PAIN_FUNCTIONAL_ASSESSMENT: 0-10

## 2024-09-26 ASSESSMENT — PAIN DESCRIPTION - LOCATION
LOCATION: LEG

## 2024-09-26 ASSESSMENT — ACTIVITIES OF DAILY LIVING (ADL)
HOME_MANAGEMENT_TIME_ENTRY: 2
HOME_MANAGEMENT_TIME_ENTRY: 23

## 2024-09-26 ASSESSMENT — PAIN SCALES - PAIN ASSESSMENT IN ADVANCED DEMENTIA (PAINAD): TOTALSCORE: MEDICATION (SEE MAR)

## 2024-09-26 ASSESSMENT — PAIN DESCRIPTION - ORIENTATION
ORIENTATION: RIGHT

## 2024-09-26 ASSESSMENT — PAIN DESCRIPTION - DESCRIPTORS
DESCRIPTORS: THROBBING
DESCRIPTORS: THROBBING

## 2024-09-26 NOTE — CARE PLAN
Problem: Pain  Goal: Takes deep breaths with improved pain control throughout the shift  Outcome: Progressing  Goal: Turns in bed with improved pain control throughout the shift  Outcome: Progressing  Goal: Walks with improved pain control throughout the shift  Outcome: Progressing  Goal: Performs ADL's with improved pain control throughout shift  Outcome: Progressing  Goal: Participates in PT with improved pain control throughout the shift  Outcome: Progressing  Goal: Free from opioid side effects throughout the shift  Outcome: Progressing  Goal: Free from acute confusion related to pain meds throughout the shift  Outcome: Progressing     Problem: Fall/Injury  Goal: Not fall by end of shift  Outcome: Progressing  Goal: Be free from injury by end of the shift  Outcome: Progressing  Goal: Verbalize understanding of personal risk factors for fall in the hospital  Outcome: Progressing  Goal: Verbalize understanding of risk factor reduction measures to prevent injury from fall in the home  Outcome: Progressing  Goal: Use assistive devices by end of the shift  Outcome: Progressing  Goal: Pace activities to prevent fatigue by end of the shift  Outcome: Progressing     Problem: Pain - Adult  Goal: Verbalizes/displays adequate comfort level or baseline comfort level  Outcome: Progressing     Problem: Safety - Adult  Goal: Free from fall injury  Outcome: Progressing     Problem: Chronic Conditions and Co-morbidities  Goal: Patient's chronic conditions and co-morbidity symptoms are monitored and maintained or improved  Outcome: Progressing     Problem: Skin  Goal: Decreased wound size/increased tissue granulation at next dressing change  Outcome: Progressing  Goal: Participates in plan/prevention/treatment measures  Outcome: Progressing  Goal: Prevent/manage excess moisture  Outcome: Progressing  Goal: Prevent/minimize sheer/friction injuries  Outcome: Progressing  Goal: Promote/optimize nutrition  Outcome:  Progressing  Goal: Promote skin healing  Outcome: Progressing

## 2024-09-26 NOTE — PROGRESS NOTES
09/26/24 1206   Discharge Planning   Home or Post Acute Services Post acute facilities (Rehab/SNF/etc)   Type of Post Acute Facility Services Rehab;Skilled nursing   Expected Discharge Disposition SNF   Does the patient need discharge transport arranged? Yes   RoundTrip coordination needed? Yes   Has discharge transport been arranged? No   What day is the transport expected? 09/27/24     Pt is s/p POD #2 ORIF right tibial nailing for right distal tibia fracture on 9/24/24 wit Dr. Sarthak Ramos. Updated [facility] scores are PT (12) OT (14) recommending continued therapy at moderate-high level/intensity. Pt has history of Parkinson's. Per Orthopedics, pt is non weight bearing to right lower extremity with use of walker for assistance with ambulation, pt will DC on Xarelto PO x 28 days for DVT prophylaxis.  Pt/caregiver yesterday had decided on home with [facility] instead of SNF but after lengthy conversation with pt and caregiver today regarding pts ability to transfer, need for 24 hour supervision/assistance and need for Pepper per therapy recommendation, they have now decided that pt should go to SNF for rehab. Pt in agreement to IP rehab, pts first 2 SNF choices did not have private rooms, 3rd [facility] will have Private bed for pt 9/27, pt [facility] is now [facility] who confirms acceptance pending auth. Team notified to initiate SNF auth. JAMILA Guevara notified.    Update: per team precert initiated: Status is Pending ref # [id number]

## 2024-09-26 NOTE — PROGRESS NOTES
Jorge Barrera is a 68 y.o. male on day 3 of admission presenting with Closed fracture of right distal tibia.      Subjective   Patient examined and seen. Alert and oriented x3, resting comfortably.  Patient denies chest pain, shortness of breath, palpitations, abdominal pain, fever or chills.  Patient is agreeable to go home when cleared.  Reports support system is intact.    Pt reports despite his chronic leg weakness, he would like to go home and has assistance with his friend Lilly.   Therapy is currently working with patient, he is comfortable and pain is under control.   Therapy recommends SNF placement     Discussed with patient need to adjust his insulin demands due to his increased glucose. He did receive steroid in OR, monitor glucose readings closely. Add Lantus and adjust dose - he reports that his A1C went down in the spring, however, he did experience hypoglycemia and it was discontinued. He has a dexacom. He is comfortable with low dose Lantus due to his increase in A1C. He reports he has been a diabetic many years and is comfortable with insulin regiments. He follows closely with his PCP.     Objective     Last Recorded Vitals  /86   Pulse 90   Temp 36.5 °C (97.7 °F) (Temporal)   Resp 18   Wt 72.6 kg (160 lb)   SpO2 94%   Intake/Output last 3 Shifts:    Intake/Output Summary (Last 24 hours) at 9/26/2024 1157  Last data filed at 9/26/2024 0900  Gross per 24 hour   Intake 960 ml   Output 3650 ml   Net -2690 ml       Admission Weight  Weight: 72.6 kg (160 lb) (09/23/24 1014)    Daily Weight  09/23/24 : 72.6 kg (160 lb)    Image Results  ECG 12 lead  Normal sinus rhythm  Normal ECG  When compared with ECG of 13-APR-2022 02:02,  Nonspecific T wave abnormality no longer evident in Lateral leads  See ED provider note for full interpretation and clinical correlation  Confirmed by Rehana Spence ([id number]) on 9/25/2024 1:55:52 PM      Physical Exam  Constitutional: Well developed,  awake/alert/oriented x3, , cooperative + hx of dysphagia, forgetful at times   Respiratory/Thorax: Patent airways,  normal breath sounds with good chest expansion, thorax symmetric  Cardiovascular: Regular, rate and rhythm,   normal S 1and S 2  Musculoskeletal:  Right lower extremity in splint, toes are warm and pink, capillary refills < 2   Extremities: normal extremities,  no contusions or wounds seen  except as noted above   Skin: warm, dry, intact  Neurological: alert/oriented x 3, speech clear,   Psychiatric: appropriate mood and behavior      Relevant Results  Scheduled medications  atorvastatin, 40 mg, oral, Nightly  buPROPion SR, 150 mg, oral, BID  carvedilol, 6.25 mg, oral, BID  cholecalciferol, 1,000 Units, oral, Daily  fluticasone furoate-vilanteroL, 1 puff, inhalation, Daily  gabapentin, 300 mg, oral, BID  insulin glargine, 10 Units, subcutaneous, q24h  insulin lispro, 0-10 Units, subcutaneous, Before meals & nightly  insulin lispro, 8 Units, subcutaneous, TID  lisinopril, 10 mg, oral, Daily  metFORMIN, 500 mg, oral, Daily with breakfast  mirtazapine, 15 mg, oral, Nightly  montelukast, 10 mg, oral, Nightly  polyethylene glycol, 17 g, oral, Daily  rivaroxaban, 10 mg, oral, Daily  [Held by provider] sodium bicarbonate, 650 mg, oral, BID  tamsulosin, 0.4 mg, oral, Daily  zolpidem, 10 mg, oral, Nightly      Continuous medications  sodium chloride 0.9%, 100 mL/hr, Last Rate: 100 mL/hr (09/26/24 1116)      PRN medications  PRN medications: acetaminophen **OR** acetaminophen **OR** acetaminophen, HYDROcodone-acetaminophen, melatonin, naloxone, ondansetron **OR** ondansetron, promethazine    Results for orders placed or performed during the hospital encounter of 09/23/24 (from the past 24 hour(s))   POCT GLUCOSE   Result Value Ref Range    POCT Glucose 250 (H) 74 - 99 mg/dL   POCT GLUCOSE   Result Value Ref Range    POCT Glucose 276 (H) 74 - 99 mg/dL   Basic Metabolic Panel   Result Value Ref Range    Glucose  182 (H) 74 - 99 mg/dL    Sodium 140 136 - 145 mmol/L    Potassium 4.0 3.5 - 5.3 mmol/L    Chloride 109 (H) 98 - 107 mmol/L    Bicarbonate 23 21 - 32 mmol/L    Anion Gap 12 10 - 20 mmol/L    Urea Nitrogen 16 6 - 23 mg/dL    Creatinine 1.73 (H) 0.50 - 1.30 mg/dL    eGFR 43 (L) >60 mL/min/1.73m*2    Calcium 8.5 (L) 8.6 - 10.3 mg/dL   CBC   Result Value Ref Range    WBC 8.3 4.4 - 11.3 x10*3/uL    nRBC 0.0 0.0 - 0.0 /100 WBCs    RBC 3.41 (L) 4.50 - 5.90 x10*6/uL    Hemoglobin 10.4 (L) 13.5 - 17.5 g/dL    Hematocrit 31.2 (L) 41.0 - 52.0 %    MCV 92 80 - 100 fL    MCH 30.5 26.0 - 34.0 pg    MCHC 33.3 32.0 - 36.0 g/dL    RDW 12.6 11.5 - 14.5 %    Platelets 136 (L) 150 - 450 x10*3/uL   Magnesium   Result Value Ref Range    Magnesium 1.71 1.60 - 2.40 mg/dL   POCT GLUCOSE   Result Value Ref Range    POCT Glucose 170 (H) 74 - 99 mg/dL   POCT GLUCOSE   Result Value Ref Range    POCT Glucose 238 (H) 74 - 99 mg/dL          Assessment/Plan         # Oblique fracture of the distal shaft of the right tibia.   Displaced fracture of the proximal right fibula.      Admit to Hospital Team   Orthopedics  team Consulted for surgery in AM   DVTp and Pain management per Ortho   PT/OT evaluation  and treatment   CBC/BMP as appropriate, review results  Pulmonary Toileting   Follow up as needed outpatient with Orthopedics at discharge         # Diabetes Mellitus Type 2 Insulin Dependent  - Hyperglcemia   Continue home medications  Diet Cardiac/Diabetic  Continue Sliding Scale SSI AC/HS   A1C 6.8 from 8/2024 -   Encourage healthy lifestyle changes  A1C increased to 8.4   Add lantus and increase meal time coverage      # HTN/ HLD  Hemodynamically stable  Telemetry for arrhythmia monitoring  - reviewed  Continue Statin  Continue Coreg  Hold Lisinopril until post op labs reviewed   Labs daily      # BPH  Continue home mediations      # CKD Stage 3b  Avoid nephrotoxic agents  EMR indicates baseline 1.95   Today 1.75   Hold Lisinopril due to CKD and  post op  Reviewed 1.87 , 1.73     # Hyperkalemia  Repeat potassium   4.6  Monitor      # COPD  , not in exacerbation   Continue bronchodilators   Encourage ambulation   Wean oxygen to keep sats above 92%   Pulmonary Toileting           FULL CODE  Disposition: 24 hours   Possible Rehab Placement will be needed  DVTp: Xeralto       Time spent  36 minutes obtaining labs, imaging, recommendations, interview, assessment, examination, medication review/ordering, and EMR review.     Plan of care was discussed extensively with patient, RN and Primary attending Dr. Celaya. Patient verbalized understanding through teach back method. All questions and concerns addressed upon examination.      Of note, this documentation is completed using the Dragon Dictation system (voice recognition software). There may be spelling and/or grammatical errors that were not corrected prior to final submission.     Mery Dyer, APRN-CNP         Terbinafine Pregnancy And Lactation Text: This medication is Pregnancy Category B and is considered safe during pregnancy. It is also excreted in breast milk and breast feeding isn't recommended.

## 2024-09-26 NOTE — CARE PLAN
The patient's goals for the shift include      Problem: Pain - Adult  Goal: Verbalizes/displays adequate comfort level or baseline comfort level  Outcome: Progressing     Problem: Pain  Goal: Takes deep breaths with improved pain control throughout the shift  Outcome: Progressing     Problem: Fall/Injury  Goal: Not fall by end of shift  Outcome: Progressing     Problem: Safety - Adult  Goal: Free from fall injury  Outcome: Progressing     Problem: Chronic Conditions and Co-morbidities  Goal: Patient's chronic conditions and co-morbidity symptoms are monitored and maintained or improved  Outcome: Progressing     Problem: Skin  Goal: Prevent/minimize sheer/friction injuries  Outcome: Progressing  Flowsheets (Taken 9/26/2024 0607)  Prevent/minimize sheer/friction injuries: Increase activity/out of bed for meals     The clinical goals for the shift include Patient will remain free from falls and injury throughout shift    Over the shift, the patient remained free from falls and injury throughout shift. Patient currently resting comfortably with stable vital signs. Patient without complaints at this time.

## 2024-09-26 NOTE — PROGRESS NOTES
Occupational Therapy    OT Treatment    Patient Name: Jorge Barrera  MRN: [id number]  Department: [facility]  Room: [address] [place] [address]  Today's Date: 9/26/2024  Time Calculation  Start Time: 0941  Stop Time: 1049  Time Calculation (min): 68 min; [place] with PT 28 min (split units) with functional transfers; CNP arrival to room; OT returned to complete ADLs with 23 min individual treatment with self care task performance.       Assessment:  Pt presents with increased pain, NWB RLE, decreased balance, strength, and functional endurance limiting self care performance. Pt benefit from OT to improve ADL and decrease risk of falls for safe return to least restrictive environment.   Evaluation/Treatment Tolerance: Patient limited by fatigue, Patient limited by pain  Medical Staff Made Aware: Yes  End of Session Communication: Bedside nurse, PCT/NA/CTA, Care Coordinator  End of Session Patient Position: Up in chair, Alarm on  OT Assessment Results: Decreased ADL status, Decreased endurance, Decreased functional mobility, Decreased cognition, Decreased safe judgment during ADL    Plan:  Treatment Interventions: ADL retraining, Functional transfer training, Endurance training, Patient/family training, Equipment evaluation/education, Compensatory technique education  OT Frequency: 2 times per week  OT Discharge Recommendations: Moderate intensity level of continued care  OT - OK to Discharge: Yes, when medically appropriate    Subjective   Previous Visit Info:  OT Last Visit  OT Received On: 09/26/24  General:  General  Reason for Referral: decline in self care performance  Referred By: PT/OT 9/23/24 Jt CARRANZA/ SHELDON Ramos NWB RLE  Past Medical History Relevant to Rehab: COPD, LACIE  HTN, HLD, Neuropathy, Anxiety, GERD, DM, Hepatitis C, Dysphagia, Parkinsons, Hashimotos  Family/Caregiver Present: No (pt states caregiver is unable to come into the hospital today)  Co-Treatment: PT  Co-Treatment Reason: to maximize patient/staff safetyn while  working toward discipline specific goals  Prior to Session Communication: Bedside nurse, PCT/NA/CTA  Patient Position Received: Up in chair, Alarm on  General Comment: Pt agreeable to OT; cleared to participate by nursing    Precautions:  LE Weight Bearing Status: Right Non-Weight Bearing  Medical Precautions: Fall precautions  Precautions Comment: pt demo mod cues to maintain NWB RLE with functional transfers and ADL task completion.    Pain:  Pain Assessment  0-10 (Numeric) Pain Score: 6  Pain Type: Acute pain, Surgical pain  Pain Location: Leg  Pain Orientation: Right    Pain Limitation  ADLs/IADLs limited due to pain  Functional mobility limited due to pain    If pain is limiting:  RN or team was notified of limitations due to pain  Educated patient on positioning to reduce pain  Educated patient on rest/activity to address increase in pain  Adjusted / adapted ADLs/IADLs to reduce pain with these activities  Patient trained for proper mobility/transfer techniques to decrease pain    Objective    Cognition:  Cognition  Arousal/Alertness: Appropriate responses to stimuli  Following Commands: Follows one step commands with increased time  Safety Judgment:  (decreased safety awareness)  Problem Solving: Assistance required to generate solutions (Assistance required to generate and implement solutions)  Cognition Comments: pt demo moderate cues for problem solving, sequencing, and motor planning with functional transfers and ADL activity; decreased memory / follow thru with continued cues required throughout OT session.  Insight: Moderate  Processing Speed: Delayed    Activities of Daily Living: LE Dressing  LE Dressing: No  LE Dressing Comments: pt declined performing LB dressing due to increased fatigue with transfers and toileting; pt edu in compensatory techniques for increased independence / pain management and pt receptive to edu provided.    Toileting  Toileting Level of Assistance: Moderate assistance  Where  Assessed: Bedside commode    Functional Endurance / Standing Tolerance:  Time: 2 min  Activity: supported standing for hygiene and using urinal; assist for clothing managment; cues for maintaining WB status; poor functional activity tolerance.    Bed Mobility/Transfers: Transfers  Transfer: Yes  Transfer 1  Technique 1: Sit to stand, Stand to sit  Transfer Device 1: Walker  Transfer Level of Assistance 1: Minimum assistance, Moderate verbal cues, Moderate tactile cues    Transfers 2  Transfer From 2: Chair with arms to  Transfer to 2: Wheelchair  Transfer Device 2: Walker  Transfer Level of Assistance 2: Maximum assistance, Moderate assistance, Maximum verbal cues, Maximum tactile cues  Trials/Comments 2: Pt initally max A -D x2 for static hop at FWW; poor motor planning and sequecing with max cues necessary; pt pulling up on walker and lifting walker into air with attempting to hop; increased time with continued education and therapist demonstration with max cues/ effort throughout. Seated rest break provided due to poor functional endurance; pt states he was a smoker / SOB. Multiple trials of transfers at FWW level completed and pt progressed to mod A x1 however continued to require max cues for safety.    Toilet Transfers  Toilet Transfer From: Recliner  Toilet Transfer Type: To and from  Toilet Transfer to: Standard bedside commode  Toilet Transfer Technique: To right, To left  Toilet Transfers: Moderate assistance, Verbal cues    Standing Balance:   Mod A x1 stand balance for clothing management with toileting    Skilled BADL Treatment / Intervention  BADL process / adaptation training  Safety deficit modifications  Compensatory training  Energy conservation  Environmental modifications    Progress in BADL Status  Improvement noted  Cueing required  Use of compensatory strategies    Outcome Measures:  [facility] Daily Activity  Putting on and taking off regular lower body clothing: Total  Bathing (including washing,  rinsing, drying): A lot  Putting on and taking off regular upper body clothing: A little  Toileting, which includes using toilet, bedpan or urinal: A lot  Taking care of personal grooming such as brushing teeth: A lot  Eating Meals: None  Daily Activity - Total Score: 14    Education Documentation  Precautions, taught by Jovana Nicole OT at 9/26/2024 11:11 AM.  Learner: Patient  Readiness: Acceptance  Method: Demonstration  Response: Needs Reinforcement    Home Exercise Program, taught by Jovana Nicole OT at 9/26/2024 11:11 AM.  Learner: Patient  Readiness: Acceptance  Method: Demonstration  Response: Needs Reinforcement    ADL and Functional Transfer Training, taught by Jovana Nicole OT at 9/26/2024 11:11 AM.  Learner: Patient  Readiness: Acceptance  Method: Demonstration  Response: Needs Reinforcement    Goals:  Encounter Problems       Encounter Problems (Active)       OT Goals       pt will dress LB with min A (Progressing)       Start:  09/25/24    Expected End:  10/09/24            Pt will transfer to bed, chair, toilet with min A (Progressing)       Start:  09/25/24    Expected End:  10/09/24            pt will complete B UE there ex to increase strength for functional transfers (Progressing)       Start:  09/25/24    Expected End:  10/09/24            Pt will demo fair + dyn std balance with ADLS (Progressing)       Start:  09/25/24    Expected End:  10/09/24

## 2024-09-26 NOTE — PROGRESS NOTES
Physical Therapy    Physical Therapy    Physical Therapy Treatment    Patient Name: Jorge Barrera  MRN: [id number]  Today's Date: 9/26/2024  Time Calculation  Start Time: 0941  Stop Time: 1009  Time Calculation (min): 28 min       Assessment/Plan   PT Assessment  PT Assessment Results: Decreased strength, Decreased endurance, Impaired balance, Decreased mobility, Impaired judgement, Decreased safety awareness  Rehab Prognosis: Good  Barriers to Discharge: Resides alone, NWB status RLE  Evaluation/Treatment Tolerance: Patient limited by fatigue  End of Session Communication: Bedside nurse  Assessment Comment:  (Patent will benefit from furrther PT to increase mobity, educate further on NWB and use of appropriate aides, Progress mobilty)  End of Session Patient Position: Up in chair, Alarm on     PT Plan  Treatment/Interventions: Bed mobility, Transfer training, Gait training, Balance training, Strengthening, Therapeutic exercise, Therapeutic activity, Wheelchair management  PT Frequency: Daily  PT Discharge Recommendations:  (Continued PT at moderate to high intensity and high frequency in 24/7 setting)  Equipment Recommended upon Discharge:  (TBD)  PT Recommended Transfer Status: Assist x2      Current Problem:  1. Closed fracture of distal end of right tibia, unspecified fracture morphology, initial encounter  Case Request Operating Room: Open Reduction Internal Fixation Ankle    Case Request Operating Room: Open Reduction Internal Fixation Ankle    FL fluoro images no charge    FL fluoro images no charge    acetaminophen (Tylenol) 325 mg tablet    naloxone (Narcan) 4 mg/0.1 mL nasal spray    polyethylene glycol (Glycolax, Miralax) 17 gram packet    HYDROcodone-acetaminophen (Norco) 5-325 mg tablet    rivaroxaban (Xarelto) 10 mg tablet    docusate sodium (Colace) 100 mg capsule    Walker standard    Wheelchair    DISCONTINUED: HYDROcodone-acetaminophen (Norco) 5-325 mg tablet    DISCONTINUED: rivaroxaban (Xarelto) 10  "mg tablet    CANCELED: Case Request Operating Room: Open Reduction Internal Fixation Ankle    CANCELED: Case Request Operating Room: Open Reduction Internal Fixation Ankle      2. Hyperglycemia        3. Hyperkalemia        4. Controlled type 2 diabetes mellitus without complication, with long-term current use of insulin (Multi)  Fiasp FlexTouch U-100 Insulin 100 unit/mL (3 mL) injection    insulin glargine (Lantus) 100 unit/mL (3 mL) pen    glucose 4 gram chewable tablet    pen needle, diabetic 31 gauge x 3/16\" needle    Referral to [facility]      5. Type 2 diabetes mellitus without complication, with long-term current use of insulin (Multi)  Referral to [facility]          General Visit Information:   PT  Visit  PT Received On: 09/26/24  General  Reason for Referral: s/p ORIF RIght tibia nailing 9/23/24  Family/Caregiver Present: No  Co-Treatment: PT  Co-Treatment Reason: to maximize patient/staff safety  Prior to Session Communication: Bedside nurse  Patient Position Received: Up in chair, Alarm on  Preferred Learning Style: verbal, visual  General Comment: Pt agreeable to PT.  Subjective     Precautions:  Precautions  LE Weight Bearing Status: Right Non-Weight Bearing  Medical Precautions: Fall precautions  Precautions Comment:  (Pt instructed with NWB on RLE in transfers/ ambulation.  Require mod cueing with reiterating instructions- appears to have difficulty processing instructions and following through with them after a few minutes absence.)         Objective     Pain:  Pain Assessment  Pain Assessment: 0-10  0-10 (Numeric) Pain Score: 6  Pain Type: Acute pain, Surgical pain  Pain Location: Leg  Pain Orientation: Right                        Treatments:     Therapeutic Activity  Therapeutic Activity Performed: Yes  Therapeutic Activity 1:  (Pt instructed with locking/ unlocking WC.  Pt started to propell forward and some backward in WC.  Needed cueing for turning the " WC.)        Ambulation/Gait Training  Ambulation/Gait Training Performed: Yes  Ambulation/Gait Training 1  Surface 1: Level tile  Device 1: Rolling walker  Assistance 1: Moderate assistance (Pt require mod A x2 when hopping from recliner to WC.)  Quality of Gait 1: Decreased step length  Comments/Distance (ft) 1: Max assist- poor motor training. (Pt attempted to hop in place maintaining NWB through RLE. Require constant cues for proper technique.  Took 4 hops forward/backward with v/c maintining NWB through RLE with mod Ax2.)  Transfers  Transfer: Yes  Transfer 1  Technique 1: Sit to stand, Stand to sit  Transfer Device 1: Walker  Transfer Level of Assistance 1: Moderate assistance (Pt initially needed mod A then progressed to min A maintining NWB on RLE.)  Trials/Comments 1:  (Pt transfers from recliner to WC with mod A)          Outcome Measures:  [facility] Basic Mobility  Turning from your back to your side while in a flat bed without using bedrails: A little  Moving from lying on your back to sitting on the side of a flat bed without using bedrails: A little  Moving to and from bed to chair (including a wheelchair): A lot  Standing up from a chair using your arms (e.g. wheelchair or bedside chair): A lot  To walk in hospital room: Total  Climbing 3-5 steps with railing: Total  Basic Mobility - Total Score: 12  Education Documentation  No documentation found.  Education Comments  No comments found.          Individual(s) Educated: Patient  Education Provided: Fall Risk, Home Safety, Post-Op Precautions  Encounter Problems       Encounter Problems (Active)       PT Problem       Bed mobility supine <>sit independent  (Progressing)       Start:  09/25/24    Expected End:  10/09/24            Transfers sit <> stand /bed<> chair with ww + 1 minimal assist (Progressing)       Start:  09/25/24    Expected End:  10/09/24            Patient to maneuver wheelchair> 50' to include negotiation of doorways and objects, engaging  locks/unlocking supervised.  (Progressing)       Start:  09/25/24    Expected End:  10/09/24               Pain - Adult

## 2024-09-27 LAB
GLUCOSE BLD MANUAL STRIP-MCNC: 159 MG/DL (ref 74–99)
GLUCOSE BLD MANUAL STRIP-MCNC: 160 MG/DL (ref 74–99)
GLUCOSE BLD MANUAL STRIP-MCNC: 200 MG/DL (ref 74–99)
GLUCOSE BLD MANUAL STRIP-MCNC: 214 MG/DL (ref 74–99)

## 2024-09-27 PROCEDURE — 1100000001 HC PRIVATE ROOM DAILY

## 2024-09-27 PROCEDURE — 2500000004 HC RX 250 GENERAL PHARMACY W/ HCPCS (ALT 636 FOR OP/ED)

## 2024-09-27 PROCEDURE — 2500000001 HC RX 250 WO HCPCS SELF ADMINISTERED DRUGS (ALT 637 FOR MEDICARE OP)

## 2024-09-27 PROCEDURE — 2500000002 HC RX 250 W HCPCS SELF ADMINISTERED DRUGS (ALT 637 FOR MEDICARE OP, ALT 636 FOR OP/ED): Performed by: REGISTERED NURSE

## 2024-09-27 PROCEDURE — 99239 HOSP IP/OBS DSCHRG MGMT >30: CPT | Performed by: STUDENT IN AN ORGANIZED HEALTH CARE EDUCATION/TRAINING PROGRAM

## 2024-09-27 PROCEDURE — 2500000002 HC RX 250 W HCPCS SELF ADMINISTERED DRUGS (ALT 637 FOR MEDICARE OP, ALT 636 FOR OP/ED): Performed by: STUDENT IN AN ORGANIZED HEALTH CARE EDUCATION/TRAINING PROGRAM

## 2024-09-27 PROCEDURE — 2500000002 HC RX 250 W HCPCS SELF ADMINISTERED DRUGS (ALT 637 FOR MEDICARE OP, ALT 636 FOR OP/ED)

## 2024-09-27 PROCEDURE — 2500000004 HC RX 250 GENERAL PHARMACY W/ HCPCS (ALT 636 FOR OP/ED): Performed by: STUDENT IN AN ORGANIZED HEALTH CARE EDUCATION/TRAINING PROGRAM

## 2024-09-27 PROCEDURE — 2500000001 HC RX 250 WO HCPCS SELF ADMINISTERED DRUGS (ALT 637 FOR MEDICARE OP): Performed by: REGISTERED NURSE

## 2024-09-27 PROCEDURE — 97530 THERAPEUTIC ACTIVITIES: CPT | Mod: GP,CQ

## 2024-09-27 PROCEDURE — 97116 GAIT TRAINING THERAPY: CPT | Mod: GP,CQ

## 2024-09-27 PROCEDURE — 82947 ASSAY GLUCOSE BLOOD QUANT: CPT

## 2024-09-27 RX ORDER — MAGNESIUM SULFATE HEPTAHYDRATE 40 MG/ML
2 INJECTION, SOLUTION INTRAVENOUS ONCE
Status: COMPLETED | OUTPATIENT
Start: 2024-09-27 | End: 2024-09-27

## 2024-09-27 ASSESSMENT — PAIN - FUNCTIONAL ASSESSMENT
PAIN_FUNCTIONAL_ASSESSMENT: 0-10

## 2024-09-27 ASSESSMENT — COGNITIVE AND FUNCTIONAL STATUS - GENERAL
STANDING UP FROM CHAIR USING ARMS: A LOT
CLIMB 3 TO 5 STEPS WITH RAILING: TOTAL
MOBILITY SCORE: 12
MOVING FROM LYING ON BACK TO SITTING ON SIDE OF FLAT BED WITH BEDRAILS: A LITTLE
WALKING IN HOSPITAL ROOM: TOTAL
MOVING TO AND FROM BED TO CHAIR: A LOT
TURNING FROM BACK TO SIDE WHILE IN FLAT BAD: A LITTLE

## 2024-09-27 ASSESSMENT — PAIN DESCRIPTION - DESCRIPTORS
DESCRIPTORS: BURNING
DESCRIPTORS: THROBBING
DESCRIPTORS: ACHING

## 2024-09-27 ASSESSMENT — PAIN SCALES - GENERAL
PAINLEVEL_OUTOF10: 3
PAINLEVEL_OUTOF10: 8
PAINLEVEL_OUTOF10: 3
PAINLEVEL_OUTOF10: 2
PAINLEVEL_OUTOF10: 5 - MODERATE PAIN
PAINLEVEL_OUTOF10: 8
PAINLEVEL_OUTOF10: 7

## 2024-09-27 ASSESSMENT — PAIN DESCRIPTION - LOCATION: LOCATION: LEG

## 2024-09-27 ASSESSMENT — PAIN DESCRIPTION - ORIENTATION: ORIENTATION: RIGHT

## 2024-09-27 NOTE — CARE PLAN
The patient's goals for the shift include adequate pain control.     The clinical goals for the shift include Patietn will remain free from falls and injury throughout shift    Over the shift, the patient did make progress toward these goals.

## 2024-09-27 NOTE — CONSULTS
Social Work Note  Arrangements have been made for the pt to transfer to [facility] on 9/28/24 at 10:00am  via [facility]. The patricio ava and Pas R [phone] are completed and the insurance  pre authorization has been approved. The pt remains receptive to the transfer to [facility] for continued skilled care and therapy.  The number for report is [phone].   The pt will notify his , Lilly of the transfer arrangments.

## 2024-09-27 NOTE — PROGRESS NOTES
Physical Therapy    Physical Therapy Treatment    Patient Name: Jorge Barrera  MRN: [id number]  Today's Date: 9/27/2024  Time Calculation  Start Time: 1111  Stop Time: 1136  Time Calculation (min): 25 min     622/622-A    Assessment/Plan   PT Assessment  PT Assessment Results: Decreased strength, Decreased endurance, Impaired balance, Decreased mobility, Decreased safety awareness, Pain  Rehab Prognosis: Good  Barriers to Discharge: Resides alone, NWB status RLE  Evaluation/Treatment Tolerance: Patient tolerated treatment well (rest breaks required)  End of Session Communication: Bedside nurse  Assessment Comment: pt is receptive to instruction and motivated to participate, would benefit from continued therapy to impriove functional mobiity and safety  End of Session Patient Position: Up in chair, Alarm on     Treatment/Interventions: Bed mobility, Transfer training, Gait training, Balance training, Strengthening, Therapeutic exercise, Therapeutic activity, Wheelchair management     PT Frequency: Daily  PT Discharge Recommendations:  (Continued PT at moderate to high intensity and high frequency in 24/7 setting)  Equipment Recommended upon Discharge:  (TBD)   PT Recommended Transfer Status: Assist x2    General Visit Information:   PT  Visit  PT Received On: 09/27/24  General  Reason for Referral: s/p ORIF RIght tibia nailing 9/23/24  Family/Caregiver Present: No  Prior to Session Communication: Bedside nurse (cleared to particiapate)  Patient Position Received: Bed, 3 rail up, Alarm on  General Comment: Pt agreeable to PT. states he is feeling a littlebetter and not  having  as much pain today        Subjective     Precautions:  Precautions  Hearing/Visual Limitations: [place]  LE Weight Bearing Status: Right Non-Weight Bearing  Medical Precautions: Fall precautions  Precautions Comment:  (pt required less vc  to mainitain NWB today, able to stand and maintain R foot not touching ground , as he fatigues he  rest foot  on  ground and appears to b WT through it)    Vital Signs:     Objective     Pain:  Pain Assessment  Pain Assessment: 0-10  0-10 (Numeric) Pain Score: 2  Pain Type: Acute pain, Surgical pain  Pain Location: Leg  Pain Orientation: Right  Pain Descriptors: Aching  Pain Interventions: Repositioned (R LE elavated at end of session. Pt  states he has been medicated and it seems to be helping)    Cognition:  Cognition  Overall Cognitive Status: Within Functional Limits  Processing Speed: Delayed          Treatments:    Bed Mobility  Bed Mobility: Yes (transfers supine to sit with CGA and support to R LE , head of bed elevated)  Ambulation/Gait Training  Ambulation/Gait Training Performed: Yes  Ambulation/Gait Training 1  Surface 1: Level tile  Device 1: Rolling walker  Assistance 1: Moderate assistance (Pt require mod A x2 when hopping from recliner to WC. Needs manual asssit for walker placement)  Comments/Distance (ft) 1: Mod A x 2  with WW and vc to maintain NWB, Pt able to take 4 to 5 steps/ hops from bed to recline chair going to L side . Pt  also tolerated standing wuth WW and Mod A for up to 45 seconds while maintaining NWB through R LE , vc for upright posture  Transfers  Transfer: Yes  Transfer 1  Technique 1: Sit to stand, Stand to sit  Transfer Level of Assistance 1:  (Pt initially needed mod A then progressed to min A maintining NWB on RLE.)  Trials/Comments 1: Initial  sit  to stand  with WW and Mod A x 2 progressing to Mod A and vc for hand placement for sit<-->stand  Performed multiple trials  Stairs  Stairs: No       Outcome Measures:     [facility] Basic Mobility  Turning from your back to your side while in a flat bed without using bedrails: A little  Moving from lying on your back to sitting on the side of a flat bed without using bedrails: A little  Moving to and from bed to chair (including a wheelchair): A lot  Standing up from a chair using your arms (e.g. wheelchair or bedside chair): A lot  To walk in  hospital room: Total  Climbing 3-5 steps with railing: Total  Basic Mobility - Total Score: 12             EDUCATION:    Individual(s) Educated: Patient  Education Provided: Fall Risk, Home Safety, Post-Op Precautions  Patient Response to Education: Patient/Caregiver Verbalized Understanding of Information    Encounter Problems       Encounter Problems (Active)       PT Problem       Bed mobility supine <>sit independent  (Progressing)       Start:  09/25/24    Expected End:  10/09/24            Transfers sit <> stand /bed<> chair with ww + 1 minimal assist (Progressing)       Start:  09/25/24    Expected End:  10/09/24            Patient to maneuver wheelchair> 50' to include negotiation of doorways and objects, engaging locks/unlocking supervised.  (Not Progressing)       Start:  09/25/24    Expected End:  10/09/24               Pain - Adult

## 2024-09-27 NOTE — CARE PLAN
The patient's goals for the shift include      Problem: Skin  Goal: Participates in plan/prevention/treatment measures  Outcome: Progressing  Flowsheets (Taken 9/27/2024 0533)  Participates in plan/prevention/treatment measures: Increase activity/out of bed for meals  Goal: Prevent/manage excess moisture  Outcome: Progressing  Flowsheets (Taken 9/27/2024 0533)  Prevent/manage excess moisture: Moisturize dry skin     The clinical goals for the shift include Patietn will remain free from falls and injury throughout shift    Over the shift, the patient remained free from falls and injury throughout shift. Patient currently resting comfortably without complaints.

## 2024-09-27 NOTE — DISCHARGE SUMMARY
"Discharge Diagnosis  Closed fracture of right distal tibia  Displaced fracture of the proximal right fibula   Hyperglycemia in the setting of DM2    Issues Requiring Follow-Up  Outpatient follow-up with orthopedic surgery status post right IM tibial nail   Outpatient follow-up with primary care for diabetic management and other comorbidities    Discharge Meds     Medication List      START taking these medications     acetaminophen 325 mg tablet; Commonly known as: Tylenol; Take 2 tablets   (650 mg) by mouth every 4 hours if needed for mild pain (1 - 3) (Use as   directed, do not exceed recommended amount).   docusate sodium 100 mg capsule; Commonly known as: Colace; Take 1   capsule (100 mg) by mouth 2 times a day for 14 days. Hold for loose   stools.   glucose 4 gram chewable tablet; Chew 2 tablets (8 g) if needed for low   blood sugar (for glucose < 70 mg/ dL or symptomatic) for up to 3 days.   HYDROcodone-acetaminophen 5-325 mg tablet; Commonly known as: Norco;   Take 1 tablet by mouth every 6 hours if needed for severe pain (7 - 10)   for up to 7 days.   insulin glargine 100 unit/mL injection; Commonly known as: Lantus;   Inject 20 Units under the skin once every 24 hours. Take as directed per   insulin instructions.   naloxone 4 mg/0.1 mL nasal spray; Commonly known as: Narcan; Administer   1 spray (4 mg) into affected nostril(s) if needed for opioid reversal or   respiratory depression. May repeat every 2-3 minutes if needed,   alternating nostrils, until medical assistance becomes available.   pen needle, diabetic 31 gauge x 3/16\" needle; Use as directed   polyethylene glycol 17 gram packet; Commonly known as: Glycolax,   Miralax; Take 1 packet (17 g) by mouth once daily for 7 days.   rivaroxaban 10 mg tablet; Commonly known as: Xarelto; Take 1 tablet (10   mg) by mouth once daily for 28 days.     CHANGE how you take these medications     Fiasp FlexTouch U-100 Insulin 100 unit/mL (3 mL) injection; Generic "   drug: insulin aspart (with niacinamide); Inject 6 Units subcutaneously   daily with breakfast AND 6 Units daily with lunch AND 7 Units daily with   dinner. Total daily dose of 19 units. Hold for glucose < 125 mg/dL. Check   Glucose before and after administering (30 minutes after); What changed:   how much to take, additional instructions     CONTINUE taking these medications     atorvastatin 40 mg tablet; Commonly known as: Lipitor   Breo Ellipta 200-25 mcg/dose inhaler; Generic drug: fluticasone   furoate-vilanteroL   buPROPion  mg 12 hr tablet; Commonly known as: Wellbutrin SR   carvedilol 6.25 mg tablet; Commonly known as: Coreg   cholecalciferol 25 MCG (1000 UT) capsule; Commonly known as: Vitamin D-3   cyanocobalamin 1,000 mcg/mL injection; Commonly known as: Vitamin B-12   gabapentin 300 mg capsule; Commonly known as: Neurontin   lisinopril 10 mg tablet   metFORMIN 500 mg tablet; Commonly known as: Glucophage   mirtazapine 15 mg tablet; Commonly known as: Remeron   montelukast 10 mg tablet; Commonly known as: Singulair   sodium bicarbonate 650 mg tablet   tamsulosin 0.4 mg 24 hr capsule; Commonly known as: Flomax     STOP taking these medications     cyclobenzaprine 5 mg tablet; Commonly known as: Flexeril   zolpidem CR 12.5 mg ER tablet; Commonly known as: Ambien CR       Test Results Pending At Discharge  Pending Labs       No current pending labs.            Hospital Course  Jorge Barrera is a 68 y.o. male presenting with complaint of right ankle pain s/p mechanical fall.     # Oblique fracture of the distal shaft of the right tibia.   Displaced fracture of the proximal right fibula.   Orthopedics  team Consulted, s/p right IM tibial nail   Ortho recommended to continue with Xarelto 28 days postop along with pain management.  Patient was evaluated by PT/OT recommended SNF placement.  Ortho recommended outpatient follow-up in a week.     # Diabetes Mellitus Type 2 Insulin Dependent  - Hyperglcemia     A1C 6.8 from 8/2024 - A1C increased to 8.4   ISS, hypoglycemia protocol, POCT glucose, DM diet, Lantus was placed     # HTN/ HLD: Continue statin and Coreg, telemetry monitor  # BPH Continued home mediations      # CKD Stage 3b  Avoided nephrotoxic agents, monitored renal function     # Hyperkalemia resolved   # COPD, not in exacerbation   Continued bronchodilators, Encouraged ambulation   DVTp: was with Xeralto      Currently patient is hemodynamically stable and ready for discharge to SNF for better recovery.  He will be continued on Xarelto insulin and rest of his home medication.  He will follow-up as outpatient with his primary care and orthopedic surgery for right lower extremity fracture and other comorbidities.        Pertinent Physical Exam At Time of Discharge  Physical Exam    Constitutional: Well developed, awake/alert/oriented x3, cooperative, forgetful at times   Respiratory/Thorax: Patent airways,  normal breath sounds with good chest expansion, thorax symmetric  Cardiovascular: Regular, rate and rhythm,   normal S 1and S 2  Musculoskeletal:  Right lower extremity in splint, toes are warm and pink, capillary refills < 2   Extremities: normal extremities,  no contusions or wounds seen  except as noted above   Skin: warm, dry, intact  Neurological: alert/oriented x 3, speech clear,   Psychiatric: appropriate mood and behavior    Outpatient Follow-Up  Future Appointments   Date Time Provider Department Center   10/2/2024  1:15 PM Sarthak Ramos MD [id number] [place]       Time spent 40 minutes  Laura Celaya MD

## 2024-09-28 VITALS
WEIGHT: 160 LBS | HEART RATE: 85 BPM | BODY MASS INDEX: 25.11 KG/M2 | TEMPERATURE: 98.8 F | HEIGHT: 67 IN | RESPIRATION RATE: 18 BRPM | DIASTOLIC BLOOD PRESSURE: 78 MMHG | SYSTOLIC BLOOD PRESSURE: 165 MMHG | OXYGEN SATURATION: 94 %

## 2024-09-28 LAB — GLUCOSE BLD MANUAL STRIP-MCNC: 232 MG/DL (ref 74–99)

## 2024-09-28 PROCEDURE — 97110 THERAPEUTIC EXERCISES: CPT | Mod: GP,CQ

## 2024-09-28 PROCEDURE — 2500000001 HC RX 250 WO HCPCS SELF ADMINISTERED DRUGS (ALT 637 FOR MEDICARE OP)

## 2024-09-28 PROCEDURE — 97530 THERAPEUTIC ACTIVITIES: CPT | Mod: GP,CQ

## 2024-09-28 PROCEDURE — 2500000001 HC RX 250 WO HCPCS SELF ADMINISTERED DRUGS (ALT 637 FOR MEDICARE OP): Performed by: REGISTERED NURSE

## 2024-09-28 PROCEDURE — 2500000002 HC RX 250 W HCPCS SELF ADMINISTERED DRUGS (ALT 637 FOR MEDICARE OP, ALT 636 FOR OP/ED)

## 2024-09-28 PROCEDURE — 2500000002 HC RX 250 W HCPCS SELF ADMINISTERED DRUGS (ALT 637 FOR MEDICARE OP, ALT 636 FOR OP/ED): Performed by: REGISTERED NURSE

## 2024-09-28 PROCEDURE — 2500000004 HC RX 250 GENERAL PHARMACY W/ HCPCS (ALT 636 FOR OP/ED)

## 2024-09-28 PROCEDURE — 2500000002 HC RX 250 W HCPCS SELF ADMINISTERED DRUGS (ALT 637 FOR MEDICARE OP, ALT 636 FOR OP/ED): Performed by: STUDENT IN AN ORGANIZED HEALTH CARE EDUCATION/TRAINING PROGRAM

## 2024-09-28 PROCEDURE — 99232 SBSQ HOSP IP/OBS MODERATE 35: CPT | Performed by: STUDENT IN AN ORGANIZED HEALTH CARE EDUCATION/TRAINING PROGRAM

## 2024-09-28 PROCEDURE — 82947 ASSAY GLUCOSE BLOOD QUANT: CPT

## 2024-09-28 RX ORDER — INSULIN GLARGINE 100 [IU]/ML
20 INJECTION, SOLUTION SUBCUTANEOUS EVERY 24 HOURS
Start: 2024-09-28

## 2024-09-28 RX ORDER — INSULIN GLARGINE 100 [IU]/ML
20 INJECTION, SOLUTION SUBCUTANEOUS EVERY 24 HOURS
Status: DISCONTINUED | OUTPATIENT
Start: 2024-09-28 | End: 2024-09-28 | Stop reason: HOSPADM

## 2024-09-28 ASSESSMENT — PAIN - FUNCTIONAL ASSESSMENT
PAIN_FUNCTIONAL_ASSESSMENT: 0-10
PAIN_FUNCTIONAL_ASSESSMENT: 0-10

## 2024-09-28 ASSESSMENT — COGNITIVE AND FUNCTIONAL STATUS - GENERAL
MOVING TO AND FROM BED TO CHAIR: A LOT
MOVING FROM LYING ON BACK TO SITTING ON SIDE OF FLAT BED WITH BEDRAILS: A LITTLE
WALKING IN HOSPITAL ROOM: TOTAL
STANDING UP FROM CHAIR USING ARMS: A LOT
TURNING FROM BACK TO SIDE WHILE IN FLAT BAD: A LITTLE
CLIMB 3 TO 5 STEPS WITH RAILING: TOTAL
MOBILITY SCORE: 12

## 2024-09-28 ASSESSMENT — PAIN SCALES - GENERAL
PAINLEVEL_OUTOF10: 7
PAINLEVEL_OUTOF10: 7

## 2024-09-28 NOTE — PROGRESS NOTES
Physical Therapy    Physical Therapy Treatment    Patient Name: Jorge Barrera  MRN: [id number]  Today's Date: 9/28/2024  Time Calculation  Start Time: 0929  Stop Time: 0953  Time Calculation (min): 24 min     622/622-A    Assessment/Plan   End of Session Communication: Bedside nurse  Assessment Comment: Patient presents with good effort throughout todays session. Demonstrates improved performance of transfers while maintaining WBing. Poor tolerance to standing due to quick fatigue and poor endurance. Call light and all needs in reach.  End of Session Patient Position: Up in chair, Alarm on        General Visit Information:   PT  Visit  PT Received On: 09/28/24  General  Prior to Session Communication: Bedside nurse  Patient Position Received: Bed, 2 rail up, Alarm off, caregiver present (pt seated EOB, RN present in room)  General Comment: Pt agreeable to therapy this date however states should be DC to [facility] around 10AM.  Subjective     Precautions:  Precautions  Hearing/Visual Limitations: [place]  LE Weight Bearing Status: Right Non-Weight Bearing  Medical Precautions: Fall precautions     Objective     Pain:  Pain Assessment  Pain Assessment: 0-10  0-10 (Numeric) Pain Score: 7  Pain Type: Surgical pain  Pain Location: Leg  Pain Orientation: Right    Cognition:  Cognition  Overall Cognitive Status: Within Functional Limits  Orientation Level: Oriented X4      Treatments:  Therapeutic Exercise  Therapeutic Exercise Performed: Yes  Therapeutic Exercise Activity 1: seated, LLE ankle pumps, LAQ, marches 15x with max VC for proper performance of task. Difficulty with cordination while performing. VC to eliminate trunk compensation, focus on LE strength for optimal performance of transfers and standing tolerance on LLE.              Transfers  Transfer: Yes  Transfer 1  Transfer From 1: Bed to  Transfer to 1: Chair with arms  Technique 1: Stand pivot  Transfer Device 1: Walker, Gait belt (FWW)  Transfer Level of Assistance  1: Maximum assistance, Maximum verbal cues  Trials/Comments 1: Pt performed stand pivot from EOB<>chair with FWW, MaxA for trunk support. Pt demonstrates small hopping pattern towards recliner, able to maintain WBing throughout. Max VC for sequencing step by step for proper performance. Quick fatigue and heavy UE support limiting. Poor foot clearance, periodically would heel toe walk, untable while performing. Good eccentric control to sitting position. Fair AD management throughout.  Transfers 2  Transfer From 2: Bed to  Transfer to 2: Stand  Technique 2: Stand to sit, Sit to stand  Transfer Device 2: Walker, Gait belt (FWW)  Transfer Level of Assistance 2: Maximum assistance  Trials/Comments 2: Pt performed STS from EOB<>FWW MaxA for lifting to stand, slightly retropulsive upon initial stand. Moderate VC required for sequencing.Maintained WBing throughout.          Outcome Measures:     [facility] Basic Mobility  Turning from your back to your side while in a flat bed without using bedrails: A little  Moving from lying on your back to sitting on the side of a flat bed without using bedrails: A little  Moving to and from bed to chair (including a wheelchair): A lot  Standing up from a chair using your arms (e.g. wheelchair or bedside chair): A lot  To walk in hospital room: Total  Climbing 3-5 steps with railing: Total  Basic Mobility - Total Score: 12                                      Education Documentation  No documentation found.  Education Comments  No comments found.           EDUCATION:  Outpatient Education  Individual(s) Educated: Patient  Education Provided: Fall Risk, Home Safety, Post-Op Precautions  Patient Response to Education: Patient/Caregiver Verbalized Understanding of Information  Encounter Problems       Encounter Problems (Resolved)       PT Problem       Bed mobility supine <>sit independent  (Adequate for Discharge)       Start:  09/25/24    Expected End:  10/09/24    Resolved:  09/28/24          Transfers sit <> stand /bed<> chair with ww + 1 minimal assist (Adequate for Discharge)       Start:  09/25/24    Expected End:  10/09/24    Resolved:  09/28/24         Patient to maneuver wheelchair> 50' to include negotiation of doorways and objects, engaging locks/unlocking supervised.  (Adequate for Discharge)       Start:  09/25/24    Expected End:  10/09/24    Resolved:  09/28/24            Pain - Adult

## 2024-09-28 NOTE — CARE PLAN
The patient's goals for the shift include  rest and comfort    The clinical goals for the shift include maintain patient safety throughout shift    Over the shift, the patient did  make progress toward the following goals. Barriers to progression include none. Recommendations to address these barriers include none.

## 2024-09-28 NOTE — PROGRESS NOTES
Jorge Barrera is a 68 y.o. male on day 5 of admission presenting with Closed fracture of right distal tibia.      Subjective   Pt stable, in NAD, no complaint, plan for dc to SNF today, pre-cert available     Objective     Last Recorded Vitals  /78 (BP Location: Left arm, Patient Position: Lying)   Pulse 85   Temp 37.1 °C (98.8 °F) (Temporal)   Resp 18   Wt 72.6 kg (160 lb)   SpO2 94%   Intake/Output last 3 Shifts:    Intake/Output Summary (Last 24 hours) at 9/28/2024 0906  Last data filed at 9/28/2024 0814  Gross per 24 hour   Intake 2593 ml   Output 5025 ml   Net -2432 ml       Admission Weight  Weight: 72.6 kg (160 lb) (09/23/24 1014)    Daily Weight  09/23/24 : 72.6 kg (160 lb)    Image Results  ECG 12 lead  Normal sinus rhythm  Normal ECG  When compared with ECG of 13-APR-2022 02:02,  Nonspecific T wave abnormality no longer evident in Lateral leads  See ED provider note for full interpretation and clinical correlation  Confirmed by Rehana Spence ([id number]) on 9/25/2024 1:55:52 PM      Physical Exam    Constitutional: Well developed, awake/alert/oriented x3, cooperative, forgetful at times   Respiratory/Thorax: Patent airways,  normal breath sounds with good chest expansion, thorax symmetric  Cardiovascular: Regular, rate and rhythm,   normal S 1and S 2  Musculoskeletal:  Right lower extremity in splint, toes are warm and pink, capillary refills < 2   Extremities: normal extremities,  no contusions or wounds seen  except as noted above   Skin: warm, dry, intact  Neurological: alert/oriented x 3, speech clear,   Psychiatric: appropriate mood and behavior    Assessment & Plan      Jorge Barrera is a 68 y.o. male presenting with complaint of right ankle pain s/p mechanical fall.      # Oblique fracture of the distal shaft of the right tibia.   Displaced fracture of the proximal right fibula.   Orthopedics  team Consulted, s/p right IM tibial nail   Ortho recommended to continue with Xarelto 28 days  postop along with pain management.  Patient was evaluated by PT/OT recommended SNF placement.  Ortho recommended outpatient follow-up in a week.     # Diabetes Mellitus Type 2 Insulin Dependent  - Hyperglcemia    A1C 6.8 from 8/2024 - A1C increased to 8.4   ISS, hypoglycemia protocol, POCT glucose, DM diet, Lantus was placed     # HTN/ HLD: Continue statin and Coreg, telemetry monitor  # BPH Continued home mediations      # CKD Stage 3b  Avoided nephrotoxic agents, monitored renal function     # Hyperkalemia resolved   # COPD, not in exacerbation   Continued bronchodilators, Encouraged ambulation   DVTp: was with Xeralto       Currently patient is hemodynamically stable and ready for discharge to SNF for better recovery.  He will be continued on Xarelto insulin and rest of his home medication.  He will follow-up as outpatient with his primary care and orthopedic surgery for right lower extremity fracture and other comorbidities.      9/28/24  Pt stable, in NAD, no complaint, plan for dc to SNF today, pre-cert available   Lantus was increased to 20 units nightly as morning fasting sugar still remaining high with 10 units of Lantus       Laura Celaya MD

## 2024-09-28 NOTE — PROGRESS NOTES
09/28/24 0804   Discharge Planning   Home or Post Acute Services Post acute facilities (Rehab/SNF/etc)   Type of Post Acute Facility Services Rehab;Skilled nursing   Expected Discharge Disposition SNF   Does the patient need discharge transport arranged? Yes   RoundTrip coordination needed? Yes   Has discharge transport been arranged? Yes   What day is the transport expected? 09/28/24   What time is the transport expected? 1000     [facility] 9/28/24 at 10 AM. Pt RN given report number [phone]. GF, AVS, and DC summary attached to referral in [facility]. DSC completed AVS. Pt and caregiver aware of time of transport and in agreement.

## 2024-09-29 ENCOUNTER — NURSING HOME VISIT (OUTPATIENT)
Dept: POST ACUTE CARE | Facility: EXTERNAL LOCATION | Age: 69
End: 2024-09-29
Payer: MEDICARE

## 2024-09-29 VITALS — DIASTOLIC BLOOD PRESSURE: 79 MMHG | HEART RATE: 78 BPM | SYSTOLIC BLOOD PRESSURE: 126 MMHG

## 2024-09-29 DIAGNOSIS — S82.831S OTHER CLOSED FRACTURE OF DISTAL END OF RIGHT FIBULA, SEQUELA: ICD-10-CM

## 2024-09-29 DIAGNOSIS — D64.9 POSTOPERATIVE ANEMIA: ICD-10-CM

## 2024-09-29 DIAGNOSIS — S92.101S CLOSED NONDISPLACED FRACTURE OF RIGHT TALUS, UNSPECIFIED PORTION OF TALUS, SEQUELA: ICD-10-CM

## 2024-09-29 DIAGNOSIS — I10 PRIMARY HYPERTENSION: ICD-10-CM

## 2024-09-29 DIAGNOSIS — Z79.4 TYPE 2 DIABETES MELLITUS WITH OTHER SPECIFIED COMPLICATION, WITH LONG-TERM CURRENT USE OF INSULIN: ICD-10-CM

## 2024-09-29 DIAGNOSIS — N18.32 CHRONIC RENAL IMPAIRMENT, STAGE 3B (MULTI): ICD-10-CM

## 2024-09-29 DIAGNOSIS — E11.69 TYPE 2 DIABETES MELLITUS WITH OTHER SPECIFIED COMPLICATION, WITH LONG-TERM CURRENT USE OF INSULIN: ICD-10-CM

## 2024-09-29 DIAGNOSIS — S82.391S OTHER CLOSED FRACTURE OF DISTAL END OF RIGHT TIBIA, SEQUELA: Primary | ICD-10-CM

## 2024-09-29 PROCEDURE — 99306 1ST NF CARE HIGH MDM 50: CPT | Performed by: INTERNAL MEDICINE

## 2024-09-29 ASSESSMENT — ENCOUNTER SYMPTOMS
CONFUSION: 0
SHORTNESS OF BREATH: 0
FATIGUE: 0
NERVOUS/ANXIOUS: 0
CONSTIPATION: 1
APNEA: 0
COUGH: 0
WEAKNESS: 0
ABDOMINAL PAIN: 0
VOMITING: 0
ACTIVITY CHANGE: 1
DIZZINESS: 0
DIARRHEA: 0
ARTHRALGIAS: 1
WOUND: 1

## 2024-09-29 NOTE — PROGRESS NOTES
Subjective   Patient ID: Jorge Barrera is a 68 y.o. male who is acute skilled care and presents for initial visit for skilled nursing.    68-year-old male patient usually in a good state of health, he lives independently, he has a fall, fall related to injury to right ankle, he has a fracture of the distal tibia and the fracture fragment was displaced, he was having fracture of the distal fibula, fibular fracture was at the malleolus.  He was having fracture of the talar bone also.  Patient was hospitalized, IM nailing was done for right distal tibia fracture.  Postoperatively patient did well, patient is not allowed to do weightbearing, patient is considered for skilled nursing and rehabilitation and hence admitted here at the facility.  Patient has a diabetes, patient is a chronic kidney disease, postoperatively patient's hemoglobin was 10.2.  He does not have any other major medical problems or concerns, he has a cast and immobilizer at right lower extremity.  Patient must have a history of depression and anxiety, it is not very much clear why patient is on Breo he is not a smoker.  His hemoglobin A1c was more than 8.  When I saw him he was sleeping comfortably, did not have any complaints or concerns, still he is able to do weightbearing he is expected to be here for skilled nursing and rehabilitation.  Overnight no events or concerns, all other report and information during hospitalization were reviewed.         Review of Systems   Constitutional:  Positive for activity change. Negative for fatigue.   Respiratory:  Negative for apnea, cough and shortness of breath.    Cardiovascular:  Negative for chest pain.   Gastrointestinal:  Positive for constipation. Negative for abdominal pain, diarrhea and vomiting.   Musculoskeletal:  Positive for arthralgias and gait problem.   Skin:  Positive for wound.   Neurological:  Negative for dizziness and weakness.   Psychiatric/Behavioral:  Negative for confusion. The  patient is not nervous/anxious.        Objective   /79   Pulse 78     Physical Exam  Constitutional:       Appearance: Normal appearance. He is normal weight.   HENT:      Head: Normocephalic.      Nose: Nose normal.   Eyes:      Conjunctiva/sclera: Conjunctivae normal.   Cardiovascular:      Rate and Rhythm: Normal rate and regular rhythm.      Heart sounds: Normal heart sounds.   Pulmonary:      Effort: Pulmonary effort is normal.      Breath sounds: Normal breath sounds.   Abdominal:      Palpations: Abdomen is soft.   Musculoskeletal:         General: Tenderness and signs of injury present. No swelling.      Cervical back: Neck supple.      Comments: Cast and immobiliser RLL.   Skin:     General: Skin is warm and dry.      Findings: Ecchymosis and wound present.   Neurological:      Mental Status: He is oriented to person, place, and time. Mental status is at baseline.   Psychiatric:         Mood and Affect: Mood normal.         Assessment/Plan   Problem List Items Addressed This Visit             ICD-10-CM    Closed fracture of right distal tibia - Primary S82.301A    HTN (hypertension) I10    Diabetes mellitus, type 2 (Multi) E11.9    Chronic renal insufficiency N18.9     Other Visit Diagnoses         Codes    Closed nondisplaced fracture of right talus, unspecified portion of talus, sequela     S92.101S    Other closed fracture of distal end of right fibula, sequela     S82.831S    Postoperative anemia     D64.9        Patient's condition was assessed, listed to be on atorvastatin, Breo, bupropion, carvedilol, B12 injection, insulin aspart with niacinamide at 6 units 3 times a day, gabapentin 300 mg twice a day, hydrocodone, Lantus 10 units once a day, lisinopril, metformin, mirtazapine 15 mg, montelukast, sodium bicarb tablet twice a day, tamsulosin, Xarelto till 27 October.  Physical therapy, Occupational Therapy evaluation are in process, other routine safety precautions has to be taken as per the  facility protocol.  Patient is calm comfortable and patient is not having that much of pain and discomfort.  Laboratories will be done as per our routine, blood sugars will be monitored abnormal values will be notified to us.  Orthopedic fracture is appropriately, fracture will take some time to heal, CKD remains, hemoglobin will improve slowly and gradually, BP readings will be monitored, discussed with nursing staff, short to intermediate level of skilled nursing and rehabilitation is warranted.  Any issues or concerns will be directed to us, chances of readmission is about 90%.     Goals    None

## 2024-09-29 NOTE — LETTER
Patient: Jorge Barrera  : 1955    Encounter Date: 2024    Subjective  Patient ID: Jorge Barrera is a 68 y.o. male who is acute skilled care and presents for initial visit for skilled nursing.    68-year-old male patient usually in a good state of health, he lives independently, he has a fall, fall related to injury to right ankle, he has a fracture of the distal tibia and the fracture fragment was displaced, he was having fracture of the distal fibula, fibular fracture was at the malleolus.  He was having fracture of the talar bone also.  Patient was hospitalized, IM nailing was done for right distal tibia fracture.  Postoperatively patient did well, patient is not allowed to do weightbearing, patient is considered for skilled nursing and rehabilitation and hence admitted here at the facility.  Patient has a diabetes, patient is a chronic kidney disease, postoperatively patient's hemoglobin was 10.2.  He does not have any other major medical problems or concerns, he has a cast and immobilizer at right lower extremity.  Patient must have a history of depression and anxiety, it is not very much clear why patient is on Breo he is not a smoker.  His hemoglobin A1c was more than 8.  When I saw him he was sleeping comfortably, did not have any complaints or concerns, still he is able to do weightbearing he is expected to be here for skilled nursing and rehabilitation.  Overnight no events or concerns, all other report and information during hospitalization were reviewed.         Review of Systems   Constitutional:  Positive for activity change. Negative for fatigue.   Respiratory:  Negative for apnea, cough and shortness of breath.    Cardiovascular:  Negative for chest pain.   Gastrointestinal:  Positive for constipation. Negative for abdominal pain, diarrhea and vomiting.   Musculoskeletal:  Positive for arthralgias and gait problem.   Skin:  Positive for wound.   Neurological:  Negative for dizziness and  weakness.   Psychiatric/Behavioral:  Negative for confusion. The patient is not nervous/anxious.        Objective  /79   Pulse 78     Physical Exam  Constitutional:       Appearance: Normal appearance. He is normal weight.   HENT:      Head: Normocephalic.      Nose: Nose normal.   Eyes:      Conjunctiva/sclera: Conjunctivae normal.   Cardiovascular:      Rate and Rhythm: Normal rate and regular rhythm.      Heart sounds: Normal heart sounds.   Pulmonary:      Effort: Pulmonary effort is normal.      Breath sounds: Normal breath sounds.   Abdominal:      Palpations: Abdomen is soft.   Musculoskeletal:         General: Tenderness and signs of injury present. No swelling.      Cervical back: Neck supple.      Comments: Cast and immobiliser RLL.   Skin:     General: Skin is warm and dry.      Findings: Ecchymosis and wound present.   Neurological:      Mental Status: He is oriented to person, place, and time. Mental status is at baseline.   Psychiatric:         Mood and Affect: Mood normal.         Assessment/Plan  Problem List Items Addressed This Visit             ICD-10-CM    Closed fracture of right distal tibia - Primary S82.301A    HTN (hypertension) I10    Diabetes mellitus, type 2 (Multi) E11.9    Chronic renal insufficiency N18.9     Other Visit Diagnoses         Codes    Closed nondisplaced fracture of right talus, unspecified portion of talus, sequela     S92.101S    Other closed fracture of distal end of right fibula, sequela     S82.831S    Postoperative anemia     D64.9        Patient's condition was assessed, listed to be on atorvastatin, Breo, bupropion, carvedilol, B12 injection, insulin aspart with niacinamide at 6 units 3 times a day, gabapentin 300 mg twice a day, hydrocodone, Lantus 10 units once a day, lisinopril, metformin, mirtazapine 15 mg, montelukast, sodium bicarb tablet twice a day, tamsulosin, Xarelto till 27 October.  Physical therapy, Occupational Therapy evaluation are in  process, other routine safety precautions has to be taken as per the facility protocol.  Patient is calm comfortable and patient is not having that much of pain and discomfort.  Laboratories will be done as per our routine, blood sugars will be monitored abnormal values will be notified to us.  Orthopedic fracture is appropriately, fracture will take some time to heal, CKD remains, hemoglobin will improve slowly and gradually, BP readings will be monitored, discussed with nursing staff, short to intermediate level of skilled nursing and rehabilitation is warranted.  Any issues or concerns will be directed to us, chances of readmission is about 90%.     Goals    None           Electronically Signed By: Glenn Bear MD   9/29/24  5:53 PM

## 2024-10-01 ENCOUNTER — NURSING HOME VISIT (OUTPATIENT)
Dept: POST ACUTE CARE | Facility: EXTERNAL LOCATION | Age: 69
End: 2024-10-01
Payer: MEDICARE

## 2024-10-01 VITALS
RESPIRATION RATE: 18 BRPM | HEIGHT: 67 IN | BODY MASS INDEX: 25.11 KG/M2 | DIASTOLIC BLOOD PRESSURE: 73 MMHG | WEIGHT: 160 LBS | SYSTOLIC BLOOD PRESSURE: 129 MMHG | HEART RATE: 67 BPM | TEMPERATURE: 98.2 F | OXYGEN SATURATION: 97 %

## 2024-10-01 DIAGNOSIS — Z79.4 TYPE 2 DIABETES MELLITUS WITHOUT COMPLICATION, WITH LONG-TERM CURRENT USE OF INSULIN (MULTI): ICD-10-CM

## 2024-10-01 DIAGNOSIS — S82.301A CLOSED FRACTURE OF DISTAL END OF RIGHT TIBIA, UNSPECIFIED FRACTURE MORPHOLOGY, INITIAL ENCOUNTER: Primary | ICD-10-CM

## 2024-10-01 DIAGNOSIS — Z79.01 CURRENT USE OF ANTICOAGULANT THERAPY: ICD-10-CM

## 2024-10-01 DIAGNOSIS — R52 PAIN: ICD-10-CM

## 2024-10-01 DIAGNOSIS — I10 PRIMARY HYPERTENSION: ICD-10-CM

## 2024-10-01 DIAGNOSIS — Z74.09 IMPAIRED FUNCTIONAL MOBILITY, BALANCE, GAIT, AND ENDURANCE: ICD-10-CM

## 2024-10-01 DIAGNOSIS — E11.9 TYPE 2 DIABETES MELLITUS WITHOUT COMPLICATION, WITH LONG-TERM CURRENT USE OF INSULIN (MULTI): ICD-10-CM

## 2024-10-01 PROCEDURE — 99310 SBSQ NF CARE HIGH MDM 45: CPT | Performed by: PHYSICIAN ASSISTANT

## 2024-10-01 NOTE — LETTER
"Patient: Jorge Barrera  : 1955    Encounter Date: 10/01/2024    10/1/2024  Name: Jorge Barrera  YOB: 1955    Chief complaint: R distal tibial fracture. Fall at home.    HPI: This is a 68 year old  male who has a medical history remarkable for Parkinson's, LACIE, hyperlipidemia, gerd, COPD, stage 3b CKD and fall. Patient presented to the ER for evaluation of fall at home. He fell while walking down his wheelchair ramp which had become wet after rain fall. He denies LOC or hitting his head. Patient's mobility decreased due to Parkinson's. He is able to walk but uses a scooter when longer distance are required. X-ray showed right fibula fracture and right tibia fracture. Patient was taken to the OR by Dr. Sarthak Ramos for IM nailing  of right distal third tibial shaft fracture on 2024. He denies fever, chills, sob, chest pain, palpitation, numbness or tingling in extremities. Patient's post operative course uneventful. He was discharged to SNF for rehab.    Fracture Follow-up: Patient here for follow up of a right lower leg (distal tibial ) fracture. The injury occurred 7 days ago. He reports no problems with the cast or injury, and no problems with swelling, numbness, or tingling in his R leg.     Review of systems:   ROS negative except were noted in HPI.    Code Status: full code    /73   Pulse 67   Temp 36.8 °C (98.2 °F)   Resp 18   Ht 1.702 m (5' 7\")   Wt 72.6 kg (160 lb)   SpO2 97%   BMI 25.06 kg/m²      Physical Exam  Constitutional:       General: He is not in acute distress.  HENT:      Head: Normocephalic.      Nose: Nose normal.      Mouth/Throat:      Mouth: Mucous membranes are moist.   Eyes:      Pupils: Pupils are equal, round, and reactive to light.   Cardiovascular:      Rate and Rhythm: Normal rate and regular rhythm.      Pulses: Normal pulses.   Pulmonary:      Effort: Pulmonary effort is normal.      Breath sounds: Normal breath sounds. "   Abdominal:      General: Bowel sounds are normal. There is no distension.      Palpations: Abdomen is soft.      Tenderness: There is no abdominal tenderness. There is no guarding.   Genitourinary:     Comments: Voiding.  Musculoskeletal:         General: Normal range of motion.      Cervical back: Normal range of motion.      Comments: R lower leg splint intact. Brisk capillary refill in all toes.   Lymphadenopathy:      Cervical: No cervical adenopathy.   Skin:     General: Skin is warm and dry.   Neurological:      General: No focal deficit present.      Mental Status: He is alert and oriented to person, place, and time.   Psychiatric:         Mood and Affect: Mood normal.         Behavior: Behavior normal.        Medications reviewed during visit at facility.  Fluticasone 500/50 one puff bid  Colace 100 mg po bid  Xarelto 10 mg po daily  Norco 5/325 one tablet po q 6 hours prn  Lantus insulin 10 Units subcutaneous daily  Coreg 6.25 mg po bid  Singulair 10 mg po daily  Lisinopril 10 mg po daily  Bupropion 150 mg po bid  Vitamin B 12 1000 mcg IM q month  Metformin 500 mg po bid  Fiasp FlexTouch Subcutaneous Solution Pen-injector 100 UNIT/ML 6 units subcutaneous with meals  Miralax 17 grams po daily  Gabapentin 300 mg po bid  Tylenol 650 mg po q 4 hours prn  MOM 30 ml po daily prn  Atorvastatin 40 mg po daily  Mirtazapine 15 mg po daily   Vitamin D 3 1000 UT po daily  Labs reviewed at facility:   Contains abnormal data CBC  Order: [id number]   Status: Final result       Visible to patient: No (inaccessible in [facility])    0 Result Notes            Component  Ref Range & Units 6 d ago  (9/26/24) 7 d ago  (9/25/24) 8 d ago  (9/24/24) 9 d ago  (9/23/24) 2 yr ago  (4/16/22) 2 yr ago  (4/15/22) 2 yr ago  (4/14/22)   WBC  4.4 - 11.3 x10*3/uL 8.3 8.9 6.8 6.5 4.5 R 5.0 R 3.0 Low  R   nRBC  0.0 - 0.0 /100 WBCs 0.0 0.0 0.0 0.0      RBC  4.50 - 5.90 x10*6/uL 3.41 Low  3.47 Low  3.98 Low  4.16 Low  4.15 Low  R 4.27 Low  R  4.32 Low  R   Hemoglobin  13.5 - 17.5 g/dL 10.4 Low  10.6 Low  12.2 Low  12.7 Low  12.5 Low  12.9 Low  12.6 Low    Hematocrit  41.0 - 52.0 % 31.2 Low  31.1 Low  36.7 Low  37.6 Low  37.5 Low  39.3 Low  39.5 Low    MCV  80 - 100 fL 92 90 92 90 90 92 91   MCH  26.0 - 34.0 pg 30.5 30.5 30.7 30.5      MCHC  32.0 - 36.0 g/dL 33.3 34.1 33.2 33.8 33.3 32.8 31.9 Low    RDW  11.5 - 14.5 % 12.6 12.1 12.4 12.2 12.9 13.0 12.9   Platelets  150 - 450 x10*3/uL 136 Low  132 Low  158 136 Low  156 R 143 Low  R 159 R   Resulting Agency [facility]              Specimen Collected: 09/26/24 05:47 Last Resulted: 09/26/24 06:06         Contains abnormal data Basic Metabolic Panel  Order: [id number]   Status: Final result       Visible to patient: No (inaccessible in [facility])    0 Result Notes            Component  Ref Range & Units 6 d ago  (9/26/24) 7 d ago  (9/25/24) 8 d ago  (9/24/24) 9 d ago  (9/23/24) 9 d ago  (9/23/24) 2 yr ago  (4/16/22) 2 yr ago  (4/15/22)   Glucose  74 - 99 mg/dL 182 High  414 High  199 High   458 High Panic  135 High  118 High    Sodium  136 - 145 mmol/L 140 134 Low  135 Low   134 Low  136 138   Potassium  3.5 - 5.3 mmol/L 4.0 4.9 5.1 4.6 5.7 High  3.8 4.3   Chloride  98 - 107 mmol/L 109 High  104 105  101 104 105   Bicarbonate  21 - 32 mmol/L 23 20 Low  25  28 22 22   Anion Gap  10 - 20 mmol/L 12 15 10  11 14 15   Urea Nitrogen  6 - 23 mg/dL 16 21 12  11 9 15   Creatinine  0.50 - 1.30 mg/dL 1.73 High  1.87 High  1.75 High   1.88 High  1.45 High  1.63 High    eGFR  >60 mL/min/1.73m*2 43 Low  39 Low  CM 42 Low  CM  39 Low  CM     Comment: Calculations of estimated GFR are performed using the 2021 CKD-EPI Study Refit equation without the race variable for the IDMS-Traceable creatinine methods.  https://jasn.asnjournals.org/content/early/2021/09/22/ASN.4475999977   Calcium  8.6 - 10.3 mg/dL 8.5 Low  8.3 Low  8.9  9.0 8.8 8.7   Resulting Agency  [facility]              Specimen Collected: 09/26/24 05:47 Last Resulted: 09/26/24 06:29        Assessment/Plan   Problem List Items Addressed This Visit       Closed fracture of distal end of right tibia, unspecified fracture morphology, initial encounter - Primary     Monitor incision for signs of infection. Schedule appointment with Dr Sarthak Ramos on 10/2/2024.         HTN (hypertension)     Review BP readings. Continue with Coreg 6.25 mg po bid and Lisinopril 10 mg po daily         Diabetes mellitus, type 2 (Multi)     Review blood sugars. Blood sugar today 352. Continue with Lantus 10 units subcutaneous daily.Fiasp FlexTouch Subcutaneous Solution Pen-injector 100 UNIT/ML ^ units with meals. Metformin 500 mg po bid         Impaired functional mobility, balance, gait, and endurance     PT and OT to assess and treat.         Pain     Norco 5/325 one tablet po q 6 hours prn         Current use of anticoagulant therapy     Xarelto 10 mg po daily            Time:  I spent 45 minutes or greater with the patient. Greater than 50% of this time was spent in counseling and or coordination of care. The time includes prep time of reviewing vital signs, report from direct nursing staff and or therapists, hospital documentation, reviewing labs, radiographs, diagnostic tests and or consultations, time directly spent with the patient interviewing, examining, and education regarding diagnosis, treatments, and medications, as well as documentation in the electronic medical record, and reviewing the plan of care and any new orders with the patient, nursing staff and other staff directly related to the patients care.      Dusty Agudelo PA-C       Electronically Signed By: Dusty Agudelo PA-C   10/2/24 10:23 AM

## 2024-10-01 NOTE — PROGRESS NOTES
"10/1/2024  Name: Jorge Barrera  YOB: 1955    Chief complaint: R distal tibial fracture. Fall at home.    HPI: This is a 68 year old  male who has a medical history remarkable for Parkinson's, LACIE, hyperlipidemia, gerd, COPD, stage 3b CKD and fall. Patient presented to the ER for evaluation of fall at home. He fell while walking down his wheelchair ramp which had become wet after rain fall. He denies LOC or hitting his head. Patient's mobility decreased due to Parkinson's. He is able to walk but uses a scooter when longer distance are required. X-ray showed right fibula fracture and right tibia fracture. Patient was taken to the OR by Dr. Sarthak Ramos for IM nailing  of right distal third tibial shaft fracture on 9/24/2024. He denies fever, chills, sob, chest pain, palpitation, numbness or tingling in extremities. Patient's post operative course uneventful. He was discharged to SNF for rehab.    Fracture Follow-up: Patient here for follow up of a right lower leg (distal tibial ) fracture. The injury occurred 7 days ago. He reports no problems with the cast or injury, and no problems with swelling, numbness, or tingling in his R leg.     Review of systems:   ROS negative except were noted in HPI.    Code Status: full code    /73   Pulse 67   Temp 36.8 °C (98.2 °F)   Resp 18   Ht 1.702 m (5' 7\")   Wt 72.6 kg (160 lb)   SpO2 97%   BMI 25.06 kg/m²      Physical Exam  Constitutional:       General: He is not in acute distress.  HENT:      Head: Normocephalic.      Nose: Nose normal.      Mouth/Throat:      Mouth: Mucous membranes are moist.   Eyes:      Pupils: Pupils are equal, round, and reactive to light.   Cardiovascular:      Rate and Rhythm: Normal rate and regular rhythm.      Pulses: Normal pulses.   Pulmonary:      Effort: Pulmonary effort is normal.      Breath sounds: Normal breath sounds.   Abdominal:      General: Bowel sounds are normal. There is no distension.      " Palpations: Abdomen is soft.      Tenderness: There is no abdominal tenderness. There is no guarding.   Genitourinary:     Comments: Voiding.  Musculoskeletal:         General: Normal range of motion.      Cervical back: Normal range of motion.      Comments: R lower leg splint intact. Brisk capillary refill in all toes.   Lymphadenopathy:      Cervical: No cervical adenopathy.   Skin:     General: Skin is warm and dry.   Neurological:      General: No focal deficit present.      Mental Status: He is alert and oriented to person, place, and time.   Psychiatric:         Mood and Affect: Mood normal.         Behavior: Behavior normal.        Medications reviewed during visit at facility.  Fluticasone 500/50 one puff bid  Colace 100 mg po bid  Xarelto 10 mg po daily  Norco 5/325 one tablet po q 6 hours prn  Lantus insulin 10 Units subcutaneous daily  Coreg 6.25 mg po bid  Singulair 10 mg po daily  Lisinopril 10 mg po daily  Bupropion 150 mg po bid  Vitamin B 12 1000 mcg IM q month  Metformin 500 mg po bid  Fiasp FlexTouch Subcutaneous Solution Pen-injector 100 UNIT/ML 6 units subcutaneous with meals  Miralax 17 grams po daily  Gabapentin 300 mg po bid  Tylenol 650 mg po q 4 hours prn  MOM 30 ml po daily prn  Atorvastatin 40 mg po daily  Mirtazapine 15 mg po daily   Vitamin D 3 1000 UT po daily  Labs reviewed at facility:   Contains abnormal data CBC  Order: [id number]   Status: Final result       Visible to patient: No (inaccessible in [facility])    0 Result Notes            Component  Ref Range & Units 6 d ago  (9/26/24) 7 d ago  (9/25/24) 8 d ago  (9/24/24) 9 d ago  (9/23/24) 2 yr ago  (4/16/22) 2 yr ago  (4/15/22) 2 yr ago  (4/14/22)   WBC  4.4 - 11.3 x10*3/uL 8.3 8.9 6.8 6.5 4.5 R 5.0 R 3.0 Low  R   nRBC  0.0 - 0.0 /100 WBCs 0.0 0.0 0.0 0.0      RBC  4.50 - 5.90 x10*6/uL 3.41 Low  3.47 Low  3.98 Low  4.16 Low  4.15 Low  R 4.27 Low  R 4.32 Low  R   Hemoglobin  13.5 - 17.5 g/dL 10.4 Low  10.6 Low  12.2 Low  12.7 Low   12.5 Low  12.9 Low  12.6 Low    Hematocrit  41.0 - 52.0 % 31.2 Low  31.1 Low  36.7 Low  37.6 Low  37.5 Low  39.3 Low  39.5 Low    MCV  80 - 100 fL 92 90 92 90 90 92 91   MCH  26.0 - 34.0 pg 30.5 30.5 30.7 30.5      MCHC  32.0 - 36.0 g/dL 33.3 34.1 33.2 33.8 33.3 32.8 31.9 Low    RDW  11.5 - 14.5 % 12.6 12.1 12.4 12.2 12.9 13.0 12.9   Platelets  150 - 450 x10*3/uL 136 Low  132 Low  158 136 Low  156 R 143 Low  R 159 R   Resulting Agency [facility]              Specimen Collected: 09/26/24 05:47 Last Resulted: 09/26/24 06:06         Contains abnormal data Basic Metabolic Panel  Order: [id number]   Status: Final result       Visible to patient: No (inaccessible in [facility])    0 Result Notes            Component  Ref Range & Units 6 d ago  (9/26/24) 7 d ago  (9/25/24) 8 d ago  (9/24/24) 9 d ago  (9/23/24) 9 d ago  (9/23/24) 2 yr ago  (4/16/22) 2 yr ago  (4/15/22)   Glucose  74 - 99 mg/dL 182 High  414 High  199 High   458 High Panic  135 High  118 High    Sodium  136 - 145 mmol/L 140 134 Low  135 Low   134 Low  136 138   Potassium  3.5 - 5.3 mmol/L 4.0 4.9 5.1 4.6 5.7 High  3.8 4.3   Chloride  98 - 107 mmol/L 109 High  104 105  101 104 105   Bicarbonate  21 - 32 mmol/L 23 20 Low  25  28 22 22   Anion Gap  10 - 20 mmol/L 12 15 10  11 14 15   Urea Nitrogen  6 - 23 mg/dL 16 21 12  11 9 15   Creatinine  0.50 - 1.30 mg/dL 1.73 High  1.87 High  1.75 High   1.88 High  1.45 High  1.63 High    eGFR  >60 mL/min/1.73m*2 43 Low  39 Low  CM 42 Low  CM  39 Low  CM     Comment: Calculations of estimated GFR are performed using the 2021 CKD-EPI Study Refit equation without the race variable for the IDMS-Traceable creatinine methods.  https://jasn.asnjournals.org/content/early/2021/09/22/ASN.8091357541   Calcium  8.6 - 10.3 mg/dL 8.5 Low  8.3 Low  8.9  9.0 8.8 8.7   Resulting Agency [facility]               Specimen Collected: 09/26/24 05:47 Last Resulted: 09/26/24 06:29        Assessment/Plan    Problem List Items Addressed This Visit       Closed fracture of distal end of right tibia, unspecified fracture morphology, initial encounter - Primary     Monitor incision for signs of infection. Schedule appointment with Dr Sarthak Ramos on 10/2/2024.         HTN (hypertension)     Review BP readings. Continue with Coreg 6.25 mg po bid and Lisinopril 10 mg po daily         Diabetes mellitus, type 2 (Multi)     Review blood sugars. Blood sugar today 352. Continue with Lantus 10 units subcutaneous daily.Fiasp FlexTouch Subcutaneous Solution Pen-injector 100 UNIT/ML ^ units with meals. Metformin 500 mg po bid         Impaired functional mobility, balance, gait, and endurance     PT and OT to assess and treat.         Pain     Norco 5/325 one tablet po q 6 hours prn         Current use of anticoagulant therapy     Xarelto 10 mg po daily            Time:  I spent 45 minutes or greater with the patient. Greater than 50% of this time was spent in counseling and or coordination of care. The time includes prep time of reviewing vital signs, report from direct nursing staff and or therapists, hospital documentation, reviewing labs, radiographs, diagnostic tests and or consultations, time directly spent with the patient interviewing, examining, and education regarding diagnosis, treatments, and medications, as well as documentation in the electronic medical record, and reviewing the plan of care and any new orders with the patient, nursing staff and other staff directly related to the patients care.      Dusty Agudelo PA-C

## 2024-10-02 ENCOUNTER — NURSING HOME VISIT (OUTPATIENT)
Dept: POST ACUTE CARE | Facility: EXTERNAL LOCATION | Age: 69
End: 2024-10-02

## 2024-10-02 ENCOUNTER — HOSPITAL ENCOUNTER (OUTPATIENT)
Dept: RADIOLOGY | Facility: CLINIC | Age: 69
Discharge: HOME | End: 2024-10-02
Payer: MEDICARE

## 2024-10-02 ENCOUNTER — OFFICE VISIT (OUTPATIENT)
Dept: ORTHOPEDIC SURGERY | Facility: CLINIC | Age: 69
End: 2024-10-02
Payer: MEDICARE

## 2024-10-02 DIAGNOSIS — I10 PRIMARY HYPERTENSION: ICD-10-CM

## 2024-10-02 DIAGNOSIS — D64.9 POSTOPERATIVE ANEMIA: ICD-10-CM

## 2024-10-02 DIAGNOSIS — S82.401S TIBIA AND FIBULA OPEN FRACTURE, RIGHT, SEQUELA: ICD-10-CM

## 2024-10-02 DIAGNOSIS — S92.101S CLOSED NONDISPLACED FRACTURE OF RIGHT TALUS, UNSPECIFIED PORTION OF TALUS, SEQUELA: ICD-10-CM

## 2024-10-02 DIAGNOSIS — S82.201S TIBIA AND FIBULA OPEN FRACTURE, RIGHT, SEQUELA: ICD-10-CM

## 2024-10-02 DIAGNOSIS — N18.32 CHRONIC RENAL IMPAIRMENT, STAGE 3B (MULTI): ICD-10-CM

## 2024-10-02 DIAGNOSIS — Z79.4 TYPE 2 DIABETES MELLITUS WITHOUT COMPLICATION, WITH LONG-TERM CURRENT USE OF INSULIN (MULTI): Primary | ICD-10-CM

## 2024-10-02 DIAGNOSIS — S82.391S OTHER CLOSED FRACTURE OF DISTAL END OF RIGHT TIBIA, SEQUELA: ICD-10-CM

## 2024-10-02 DIAGNOSIS — E11.9 TYPE 2 DIABETES MELLITUS WITHOUT COMPLICATION, WITH LONG-TERM CURRENT USE OF INSULIN (MULTI): Primary | ICD-10-CM

## 2024-10-02 PROBLEM — R52 PAIN: Status: ACTIVE | Noted: 2024-10-02

## 2024-10-02 PROBLEM — Z79.01 CURRENT USE OF ANTICOAGULANT THERAPY: Status: ACTIVE | Noted: 2024-10-02

## 2024-10-02 PROBLEM — Z74.09 IMPAIRED FUNCTIONAL MOBILITY, BALANCE, GAIT, AND ENDURANCE: Status: ACTIVE | Noted: 2024-10-02

## 2024-10-02 PROCEDURE — 99308 SBSQ NF CARE LOW MDM 20: CPT | Performed by: INTERNAL MEDICINE

## 2024-10-02 PROCEDURE — 73590 X-RAY EXAM OF LOWER LEG: CPT | Mod: RT

## 2024-10-02 PROCEDURE — 3052F HG A1C>EQUAL 8.0%<EQUAL 9.0%: CPT | Performed by: ORTHOPAEDIC SURGERY

## 2024-10-02 PROCEDURE — L4361 PNEUMA/VAC WALK BOOT PRE OTS: HCPCS | Performed by: ORTHOPAEDIC SURGERY

## 2024-10-02 PROCEDURE — 4010F ACE/ARB THERAPY RXD/TAKEN: CPT | Performed by: ORTHOPAEDIC SURGERY

## 2024-10-02 PROCEDURE — 1111F DSCHRG MED/CURRENT MED MERGE: CPT | Performed by: ORTHOPAEDIC SURGERY

## 2024-10-02 PROCEDURE — 1159F MED LIST DOCD IN RCRD: CPT | Performed by: ORTHOPAEDIC SURGERY

## 2024-10-02 PROCEDURE — 73590 X-RAY EXAM OF LOWER LEG: CPT | Mod: RIGHT SIDE | Performed by: ORTHOPAEDIC SURGERY

## 2024-10-02 PROCEDURE — 1036F TOBACCO NON-USER: CPT | Performed by: ORTHOPAEDIC SURGERY

## 2024-10-02 PROCEDURE — 99024 POSTOP FOLLOW-UP VISIT: CPT | Performed by: ORTHOPAEDIC SURGERY

## 2024-10-02 PROCEDURE — 99211 OFF/OP EST MAY X REQ PHY/QHP: CPT | Performed by: ORTHOPAEDIC SURGERY

## 2024-10-02 NOTE — LETTER
Patient: Jorge Barrrea  : 1955    Encounter Date: 10/02/2024    Subjective  Patient ID: Jorge Barrera is a 68 y.o. male who is acute skilled care being seen and evaluated for multiple medical problems.    Patient was reassessed, patient is supposed to be seen by orthopedics, weightbearing could be allowed after orthopedic appointment.  He has a fracture of distal tibia, distal fibula, talar bone.  Blood sugars are high, reported blood sugar is 290.  Lantus dose has been increased, Premeal insulin dose has been increased, this could be a postoperative stress.  Patient has a history of depression, CKD, anemia, laboratories are awaited today.  Pain control is reasonable, he has a hard cast on the affected area of the fracture.  Neurovascular seems to be intact.  Pain and discomfort is reasonably under control, no other issues or concerns identified by nursing staff.  Hemodynamics are stable, saturations are adequate.  Rest of medications are reviewed, patient remains on bupropion, antihypertensives, Xarelto.         Review of Systems   Constitutional:  Negative for activity change and fatigue.   Respiratory:  Negative for cough, choking and shortness of breath.    Cardiovascular:  Negative for chest pain.   Gastrointestinal:  Negative for abdominal pain and nausea.   Musculoskeletal:  Positive for arthralgias and gait problem.   Skin:  Positive for wound.   Neurological:  Negative for dizziness and weakness.   Psychiatric/Behavioral:  Negative for confusion.        Objective  /74   Pulse 84   Wt 72.6 kg (160 lb)   BMI 25.06 kg/m²     Physical Exam  Vitals reviewed.   Constitutional:       Appearance: Normal appearance. He is normal weight. He is not ill-appearing.   HENT:      Head: Normocephalic.   Eyes:      Conjunctiva/sclera: Conjunctivae normal.   Cardiovascular:      Rate and Rhythm: Normal rate and regular rhythm.   Pulmonary:      Effort: Pulmonary effort is normal.      Breath sounds: Normal  Please advise. breath sounds.   Abdominal:      Palpations: Abdomen is soft.   Musculoskeletal:         General: Tenderness and signs of injury present. No deformity.      Cervical back: Neck supple.   Skin:     General: Skin is warm and dry.      Findings: Ecchymosis and wound present.   Neurological:      General: No focal deficit present.   Psychiatric:         Behavior: Behavior normal.         Assessment/Plan  Problem List Items Addressed This Visit             ICD-10-CM    Closed fracture of right distal tibia S82.301A    HTN (hypertension) I10    Diabetes mellitus, type 2 (Multi) - Primary E11.9    Chronic renal insufficiency N18.9     Other Visit Diagnoses         Codes    Closed nondisplaced fracture of right talus, unspecified portion of talus, sequela     S92.101S    Postoperative anemia     D64.9        Patient is calm and comfortable, patient appears to be stable clinically, orthopedic injuries are all the rest of an orthopedic injuries may take some toll on physical health, weightbearing and functional status needs to be resumed slowly and gradually.  It was a fracture of the ankle, area of concern still has a hard cast.  Creatinine fluctuate from 1.8-2.  Postoperative anemia is going to improve slowly and gradually, blood sugars may not be able to be controlled strictly in the short-term stay here at the facility.  Will try our best so that healing can be not affected by hyperglycemia.  Continue medications, recovery process has been going well, slow and progressive recovery is expected.  No other issues or concerns identified by nursing staff.     Goals    None           Electronically Signed By: Glenn Bear MD   10/6/24 11:07 AM

## 2024-10-02 NOTE — ASSESSMENT & PLAN NOTE
Review blood sugars. Blood sugar today 352. Continue with Lantus 10 units subcutaneous daily.Fiasp FlexTouch Subcutaneous Solution Pen-injector 100 UNIT/ML ^ units with meals. Metformin 500 mg po bid

## 2024-10-02 NOTE — PROGRESS NOTES
History: Jorge is here for a postop visit of his right leg.  He is 8 days out from an IM nailing of a tibia fracture.  He has been in a rehab facility.  He has been nonweightbearing and is in a posterior splint.  Overall he is doing well.    Physical Exam: The splint is removed today.  The wounds are all healing very nicely.  Swelling and ecchymosis are normal for this stage of healing.  There is a 1 x 1 cm ruptured fracture blister over the medial malleolus.  There is healthy granulation tissue present.  He is able to wiggle the toes.  He denies any numbness or tingling.    Radiographs: 2 view right tibia x-rays show good alignment of his distal tibia fracture with hardware fixation in place.    Assessment: Stable right distal tibia fracture status post IM nailing, 8 days out    Plan: He is going to continue to remain nonweightbearing.  His splint was removed today and he was placed into a tall boot.  Xeroform was applied to the small fracture posterior medially and a gauze/Ace wrap barrier applied.  The remaining wounds were covered with an island dressing.  He can remove the boot for hygiene purposes.  I would like the rehab to remove his staples and apply benzoin and Steri-Strips on October 8.  He can shower and get the incisions wet with soapy water.  We will see him back in another 3 to 4 weeks to assess progress with 2 view right tibia x-rays.  If doing well we can gradually progress weightbearing.  All questions were answered the patient.

## 2024-10-06 VITALS
DIASTOLIC BLOOD PRESSURE: 74 MMHG | SYSTOLIC BLOOD PRESSURE: 140 MMHG | WEIGHT: 160 LBS | HEART RATE: 84 BPM | BODY MASS INDEX: 25.06 KG/M2

## 2024-10-06 ASSESSMENT — ENCOUNTER SYMPTOMS
ARTHRALGIAS: 1
CHOKING: 0
CONFUSION: 0
FATIGUE: 0
ABDOMINAL PAIN: 0
ACTIVITY CHANGE: 0
NAUSEA: 0
DIZZINESS: 0
SHORTNESS OF BREATH: 0
COUGH: 0
WEAKNESS: 0
WOUND: 1

## 2024-10-06 NOTE — PROGRESS NOTES
Subjective   Patient ID: Jorge Barrera is a 68 y.o. male who is acute skilled care being seen and evaluated for multiple medical problems.    Patient was reassessed, patient is supposed to be seen by orthopedics, weightbearing could be allowed after orthopedic appointment.  He has a fracture of distal tibia, distal fibula, talar bone.  Blood sugars are high, reported blood sugar is 290.  Lantus dose has been increased, Premeal insulin dose has been increased, this could be a postoperative stress.  Patient has a history of depression, CKD, anemia, laboratories are awaited today.  Pain control is reasonable, he has a hard cast on the affected area of the fracture.  Neurovascular seems to be intact.  Pain and discomfort is reasonably under control, no other issues or concerns identified by nursing staff.  Hemodynamics are stable, saturations are adequate.  Rest of medications are reviewed, patient remains on bupropion, antihypertensives, Xarelto.         Review of Systems   Constitutional:  Negative for activity change and fatigue.   Respiratory:  Negative for cough, choking and shortness of breath.    Cardiovascular:  Negative for chest pain.   Gastrointestinal:  Negative for abdominal pain and nausea.   Musculoskeletal:  Positive for arthralgias and gait problem.   Skin:  Positive for wound.   Neurological:  Negative for dizziness and weakness.   Psychiatric/Behavioral:  Negative for confusion.        Objective   /74   Pulse 84   Wt 72.6 kg (160 lb)   BMI 25.06 kg/m²     Physical Exam  Vitals reviewed.   Constitutional:       Appearance: Normal appearance. He is normal weight. He is not ill-appearing.   HENT:      Head: Normocephalic.   Eyes:      Conjunctiva/sclera: Conjunctivae normal.   Cardiovascular:      Rate and Rhythm: Normal rate and regular rhythm.   Pulmonary:      Effort: Pulmonary effort is normal.      Breath sounds: Normal breath sounds.   Abdominal:      Palpations: Abdomen is soft.    Musculoskeletal:         General: Tenderness and signs of injury present. No deformity.      Cervical back: Neck supple.   Skin:     General: Skin is warm and dry.      Findings: Ecchymosis and wound present.   Neurological:      General: No focal deficit present.   Psychiatric:         Behavior: Behavior normal.         Assessment/Plan   Problem List Items Addressed This Visit             ICD-10-CM    Closed fracture of right distal tibia S82.301A    HTN (hypertension) I10    Diabetes mellitus, type 2 (Multi) - Primary E11.9    Chronic renal insufficiency N18.9     Other Visit Diagnoses         Codes    Closed nondisplaced fracture of right talus, unspecified portion of talus, sequela     S92.101S    Postoperative anemia     D64.9        Patient is calm and comfortable, patient appears to be stable clinically, orthopedic injuries are all the rest of an orthopedic injuries may take some toll on physical health, weightbearing and functional status needs to be resumed slowly and gradually.  It was a fracture of the ankle, area of concern still has a hard cast.  Creatinine fluctuate from 1.8-2.  Postoperative anemia is going to improve slowly and gradually, blood sugars may not be able to be controlled strictly in the short-term stay here at the facility.  Will try our best so that healing can be not affected by hyperglycemia.  Continue medications, recovery process has been going well, slow and progressive recovery is expected.  No other issues or concerns identified by nursing staff.     Goals    None

## 2024-10-09 ENCOUNTER — OFFICE VISIT (OUTPATIENT)
Dept: ORTHOPEDIC SURGERY | Facility: CLINIC | Age: 69
End: 2024-10-09
Payer: MEDICARE

## 2024-10-09 DIAGNOSIS — S82.401S TIBIA AND FIBULA OPEN FRACTURE, RIGHT, SEQUELA: Primary | ICD-10-CM

## 2024-10-09 DIAGNOSIS — S82.201S TIBIA AND FIBULA OPEN FRACTURE, RIGHT, SEQUELA: Primary | ICD-10-CM

## 2024-10-09 PROCEDURE — 1036F TOBACCO NON-USER: CPT | Performed by: ORTHOPAEDIC SURGERY

## 2024-10-09 PROCEDURE — 1159F MED LIST DOCD IN RCRD: CPT | Performed by: ORTHOPAEDIC SURGERY

## 2024-10-09 PROCEDURE — 3052F HG A1C>EQUAL 8.0%<EQUAL 9.0%: CPT | Performed by: ORTHOPAEDIC SURGERY

## 2024-10-09 PROCEDURE — 99024 POSTOP FOLLOW-UP VISIT: CPT | Performed by: ORTHOPAEDIC SURGERY

## 2024-10-09 PROCEDURE — 4010F ACE/ARB THERAPY RXD/TAKEN: CPT | Performed by: ORTHOPAEDIC SURGERY

## 2024-10-09 PROCEDURE — 1111F DSCHRG MED/CURRENT MED MERGE: CPT | Performed by: ORTHOPAEDIC SURGERY

## 2024-10-09 NOTE — PROGRESS NOTES
Jorge is here for his right leg wound check.  He is 2 weeks from a tibial nailing.    Wounds are clear.  No redness or irritation.  Staples were removed and Steri-Strips were applied.    He was given an updated note for his therapist and can do some partial weightbearing in the boot.  He can remove it for hygiene purposes and exercises.  We will see him back in another 3 to 4 weeks for recheck with tibia x-rays.  If doing well we can advance to full weightbearing status.

## 2024-10-11 ENCOUNTER — HOSPITAL ENCOUNTER (EMERGENCY)
Facility: HOSPITAL | Age: 69
Discharge: HOME | End: 2024-10-12
Attending: EMERGENCY MEDICINE
Payer: MEDICARE

## 2024-10-11 ENCOUNTER — APPOINTMENT (OUTPATIENT)
Dept: CARDIOLOGY | Facility: HOSPITAL | Age: 69
End: 2024-10-11
Payer: MEDICARE

## 2024-10-11 DIAGNOSIS — R73.9 HYPERGLYCEMIA: Primary | ICD-10-CM

## 2024-10-11 LAB
ALBUMIN SERPL BCP-MCNC: 3.9 G/DL (ref 3.4–5)
ALP SERPL-CCNC: 180 U/L (ref 33–136)
ALT SERPL W P-5'-P-CCNC: 94 U/L (ref 10–52)
ANION GAP BLDV CALCULATED.4IONS-SCNC: 6 MMOL/L (ref 10–25)
ANION GAP SERPL CALC-SCNC: 11 MMOL/L (ref 10–20)
APPEARANCE UR: CLEAR
AST SERPL W P-5'-P-CCNC: 19 U/L (ref 9–39)
BASE EXCESS BLDV CALC-SCNC: 6.2 MMOL/L (ref -2–3)
BASOPHILS # BLD AUTO: 0.08 X10*3/UL (ref 0–0.1)
BASOPHILS NFR BLD AUTO: 1 %
BILIRUB SERPL-MCNC: 0.9 MG/DL (ref 0–1.2)
BILIRUB UR STRIP.AUTO-MCNC: NEGATIVE MG/DL
BODY TEMPERATURE: ABNORMAL
BUN SERPL-MCNC: 13 MG/DL (ref 6–23)
CA-I BLDV-SCNC: 1.35 MMOL/L (ref 1.1–1.33)
CALCIUM SERPL-MCNC: 9.9 MG/DL (ref 8.6–10.3)
CARDIAC TROPONIN I PNL SERPL HS: 4 NG/L (ref 0–20)
CHLORIDE BLDV-SCNC: 99 MMOL/L (ref 98–107)
CHLORIDE SERPL-SCNC: 99 MMOL/L (ref 98–107)
CO2 SERPL-SCNC: 29 MMOL/L (ref 21–32)
COLOR UR: COLORLESS
CREAT SERPL-MCNC: 1.51 MG/DL (ref 0.5–1.3)
EGFRCR SERPLBLD CKD-EPI 2021: 50 ML/MIN/1.73M*2
EOSINOPHIL # BLD AUTO: 0.19 X10*3/UL (ref 0–0.7)
EOSINOPHIL NFR BLD AUTO: 2.4 %
ERYTHROCYTE [DISTWIDTH] IN BLOOD BY AUTOMATED COUNT: 12.5 % (ref 11.5–14.5)
GLUCOSE BLD MANUAL STRIP-MCNC: 117 MG/DL (ref 74–99)
GLUCOSE BLD MANUAL STRIP-MCNC: 158 MG/DL (ref 74–99)
GLUCOSE BLD MANUAL STRIP-MCNC: 162 MG/DL (ref 74–99)
GLUCOSE BLD MANUAL STRIP-MCNC: 206 MG/DL (ref 74–99)
GLUCOSE BLD MANUAL STRIP-MCNC: 209 MG/DL (ref 74–99)
GLUCOSE BLD MANUAL STRIP-MCNC: 71 MG/DL (ref 74–99)
GLUCOSE BLDV-MCNC: 112 MG/DL (ref 74–99)
GLUCOSE SERPL-MCNC: 111 MG/DL (ref 74–99)
GLUCOSE UR STRIP.AUTO-MCNC: ABNORMAL MG/DL
HCO3 BLDV-SCNC: 33 MMOL/L (ref 22–26)
HCT VFR BLD AUTO: 32.4 % (ref 41–52)
HCT VFR BLD EST: 36 % (ref 41–52)
HGB BLD-MCNC: 11.1 G/DL (ref 13.5–17.5)
HGB BLDV-MCNC: 11.9 G/DL (ref 13.5–17.5)
IMM GRANULOCYTES # BLD AUTO: 0.02 X10*3/UL (ref 0–0.7)
IMM GRANULOCYTES NFR BLD AUTO: 0.3 % (ref 0–0.9)
INHALED O2 CONCENTRATION: 98 %
KETONES UR STRIP.AUTO-MCNC: NEGATIVE MG/DL
LACTATE BLDV-SCNC: 1.9 MMOL/L (ref 0.4–2)
LACTATE SERPL-SCNC: 1.8 MMOL/L (ref 0.4–2)
LEUKOCYTE ESTERASE UR QL STRIP.AUTO: NEGATIVE
LYMPHOCYTES # BLD AUTO: 1.8 X10*3/UL (ref 1.2–4.8)
LYMPHOCYTES NFR BLD AUTO: 22.8 %
MCH RBC QN AUTO: 30.7 PG (ref 26–34)
MCHC RBC AUTO-ENTMCNC: 34.3 G/DL (ref 32–36)
MCV RBC AUTO: 90 FL (ref 80–100)
MONOCYTES # BLD AUTO: 0.63 X10*3/UL (ref 0.1–1)
MONOCYTES NFR BLD AUTO: 8 %
NEUTROPHILS # BLD AUTO: 5.16 X10*3/UL (ref 1.2–7.7)
NEUTROPHILS NFR BLD AUTO: 65.5 %
NITRITE UR QL STRIP.AUTO: NEGATIVE
NRBC BLD-RTO: 0 /100 WBCS (ref 0–0)
OXYHGB MFR BLDV: 25.8 % (ref 45–75)
PCO2 BLDV: 57 MM HG (ref 41–51)
PH BLDV: 7.37 PH (ref 7.33–7.43)
PH UR STRIP.AUTO: 5.5 [PH]
PLATELET # BLD AUTO: 217 X10*3/UL (ref 150–450)
PO2 BLDV: 19 MM HG (ref 35–45)
POTASSIUM BLDV-SCNC: 4.4 MMOL/L (ref 3.5–5.3)
POTASSIUM SERPL-SCNC: 4.2 MMOL/L (ref 3.5–5.3)
PROT SERPL-MCNC: 7.5 G/DL (ref 6.4–8.2)
PROT UR STRIP.AUTO-MCNC: NEGATIVE MG/DL
RBC # BLD AUTO: 3.61 X10*6/UL (ref 4.5–5.9)
RBC # UR STRIP.AUTO: NEGATIVE /UL
SAO2 % BLDV: 26 % (ref 45–75)
SODIUM BLDV-SCNC: 134 MMOL/L (ref 136–145)
SODIUM SERPL-SCNC: 135 MMOL/L (ref 136–145)
SP GR UR STRIP.AUTO: 1
UROBILINOGEN UR STRIP.AUTO-MCNC: NORMAL MG/DL
WBC # BLD AUTO: 7.9 X10*3/UL (ref 4.4–11.3)

## 2024-10-11 PROCEDURE — 93005 ELECTROCARDIOGRAM TRACING: CPT

## 2024-10-11 PROCEDURE — 82947 ASSAY GLUCOSE BLOOD QUANT: CPT

## 2024-10-11 PROCEDURE — 82947 ASSAY GLUCOSE BLOOD QUANT: CPT | Performed by: NURSE PRACTITIONER

## 2024-10-11 PROCEDURE — 2500000001 HC RX 250 WO HCPCS SELF ADMINISTERED DRUGS (ALT 637 FOR MEDICARE OP): Performed by: NURSE PRACTITIONER

## 2024-10-11 PROCEDURE — 80053 COMPREHEN METABOLIC PANEL: CPT | Performed by: NURSE PRACTITIONER

## 2024-10-11 PROCEDURE — 84484 ASSAY OF TROPONIN QUANT: CPT | Performed by: NURSE PRACTITIONER

## 2024-10-11 PROCEDURE — 99283 EMERGENCY DEPT VISIT LOW MDM: CPT

## 2024-10-11 PROCEDURE — 36415 COLL VENOUS BLD VENIPUNCTURE: CPT | Performed by: NURSE PRACTITIONER

## 2024-10-11 PROCEDURE — 85018 HEMOGLOBIN: CPT | Mod: 59 | Performed by: NURSE PRACTITIONER

## 2024-10-11 PROCEDURE — 81003 URINALYSIS AUTO W/O SCOPE: CPT | Performed by: NURSE PRACTITIONER

## 2024-10-11 PROCEDURE — 83605 ASSAY OF LACTIC ACID: CPT | Performed by: NURSE PRACTITIONER

## 2024-10-11 PROCEDURE — 85025 COMPLETE CBC W/AUTO DIFF WBC: CPT | Performed by: NURSE PRACTITIONER

## 2024-10-11 RX ORDER — ACETAMINOPHEN 325 MG/1
975 TABLET ORAL ONCE
Status: COMPLETED | OUTPATIENT
Start: 2024-10-11 | End: 2024-10-11

## 2024-10-11 RX ADMIN — ACETAMINOPHEN 975 MG: 325 TABLET ORAL at 21:23

## 2024-10-11 ASSESSMENT — PAIN - FUNCTIONAL ASSESSMENT: PAIN_FUNCTIONAL_ASSESSMENT: 0-10

## 2024-10-11 ASSESSMENT — PAIN SCALES - GENERAL
PAINLEVEL_OUTOF10: 0 - NO PAIN
PAINLEVEL_OUTOF10: 0 - NO PAIN

## 2024-10-11 ASSESSMENT — LIFESTYLE VARIABLES
HAVE YOU EVER FELT YOU SHOULD CUT DOWN ON YOUR DRINKING: NO
TOTAL SCORE: 0
HAVE PEOPLE ANNOYED YOU BY CRITICIZING YOUR DRINKING: NO
EVER HAD A DRINK FIRST THING IN THE MORNING TO STEADY YOUR NERVES TO GET RID OF A HANGOVER: NO
EVER FELT BAD OR GUILTY ABOUT YOUR DRINKING: NO

## 2024-10-11 ASSESSMENT — PAIN DESCRIPTION - PROGRESSION: CLINICAL_PROGRESSION: NOT CHANGED

## 2024-10-12 VITALS
SYSTOLIC BLOOD PRESSURE: 150 MMHG | DIASTOLIC BLOOD PRESSURE: 78 MMHG | HEIGHT: 67 IN | WEIGHT: 160 LBS | HEART RATE: 79 BPM | BODY MASS INDEX: 25.11 KG/M2 | OXYGEN SATURATION: 96 % | RESPIRATION RATE: 18 BRPM | TEMPERATURE: 98.2 F

## 2024-10-12 LAB
ATRIAL RATE: 83 BPM
HOLD SPECIMEN: NORMAL
P AXIS: 75 DEGREES
P OFFSET: 205 MS
P ONSET: 149 MS
PR INTERVAL: 154 MS
Q ONSET: 226 MS
QRS COUNT: 13 BEATS
QRS DURATION: 76 MS
QT INTERVAL: 362 MS
QTC CALCULATION(BAZETT): 425 MS
QTC FREDERICIA: 403 MS
R AXIS: 66 DEGREES
T AXIS: 43 DEGREES
T OFFSET: 407 MS
VENTRICULAR RATE: 83 BPM

## 2024-10-12 ASSESSMENT — PAIN SCALES - GENERAL: PAINLEVEL_OUTOF10: 0 - NO PAIN

## 2024-10-12 ASSESSMENT — PAIN - FUNCTIONAL ASSESSMENT: PAIN_FUNCTIONAL_ASSESSMENT: 0-10

## 2024-10-12 NOTE — ED PROVIDER NOTES
HPI   Chief Complaint   Patient presents with    Hyperglycemia     Pt was seen by home health nurse and blood sugar was over 500. Squad states he was over 400. PT states he took 10 of insulin before ambulance arrived.       68-year-old male presents emergency department, patient presents from mobile nursing home where he has been staying, states he has not been able to get his blood sugar under 500 all week.  States otherwise he feels fine, no fevers or chills, nausea or vomiting.  Denies any recent stress.  Eating and drinking per his usual.    Patient states he is insulin-dependent.      History provided by:  Patient   used: No            Patient History   Past Medical History:   Diagnosis Date    Diabetes mellitus (Multi)     Hypertension      No past surgical history on file.  No family history on file.  Social History     Tobacco Use    Smoking status: Former     Types: Cigarettes    Smokeless tobacco: Never   Substance Use Topics    Alcohol use: Not Currently    Drug use: Never       Physical Exam   ED Triage Vitals   Temperature Heart Rate Respirations BP   10/11/24 1651 10/11/24 1628 10/11/24 1628 10/11/24 1628   36.8 °C (98.2 °F) 86 18 162/84      Pulse Ox Temp Source Heart Rate Source Patient Position   10/11/24 1628 10/11/24 1651 10/11/24 1927 10/11/24 1628   97 % Temporal Monitor Sitting      BP Location FiO2 (%)     10/11/24 1628 --     Right arm        Physical Exam  Constitutional: Vitals noted, no distress. Afebrile.   Cardiovascular: Regular, rate, rhythm, no murmur.   Pulmonary: Lungs clear bilaterally with good aeration. No adventitious breath sounds.   Gastrointestinal: Soft, nonsurgical. Nontender. No peritoneal signs. Normoactive bowel sounds.   Musculoskeletal: No peripheral edema. Negative Homans bilaterally, no cords.   Skin: No rash.   Neuro: No focal neurologic deficits, NIH score of 0.      ED Course & MDM   Diagnoses as of 10/11/24 2049   Hyperglycemia          Labs  Reviewed   CBC WITH AUTO DIFFERENTIAL - Abnormal       Result Value    WBC 7.9      nRBC 0.0      RBC 3.61 (*)     Hemoglobin 11.1 (*)     Hematocrit 32.4 (*)     MCV 90      MCH 30.7      MCHC 34.3      RDW 12.5      Platelets 217      Neutrophils % 65.5      Immature Granulocytes %, Automated 0.3      Lymphocytes % 22.8      Monocytes % 8.0      Eosinophils % 2.4      Basophils % 1.0      Neutrophils Absolute 5.16      Immature Granulocytes Absolute, Automated 0.02      Lymphocytes Absolute 1.80      Monocytes Absolute 0.63      Eosinophils Absolute 0.19      Basophils Absolute 0.08     COMPREHENSIVE METABOLIC PANEL - Abnormal    Glucose 111 (*)     Sodium 135 (*)     Potassium 4.2      Chloride 99      Bicarbonate 29      Anion Gap 11      Urea Nitrogen 13      Creatinine 1.51 (*)     eGFR 50 (*)     Calcium 9.9      Albumin 3.9      Alkaline Phosphatase 180 (*)     Total Protein 7.5      AST 19      Bilirubin, Total 0.9      ALT 94 (*)    BLOOD GAS VENOUS FULL PANEL - Abnormal    POCT pH, Venous 7.37      POCT pCO2, Venous 57 (*)     POCT pO2, Venous 19 (*)     POCT SO2, Venous 26 (*)     POCT Oxy Hemoglobin, Venous 25.8 (*)     POCT Hematocrit Calculated, Venous 36.0 (*)     POCT Sodium, Venous 134 (*)     POCT Potassium, Venous 4.4      POCT Chloride, Venous 99      POCT Ionized Calicum, Venous 1.35 (*)     POCT Glucose, Venous 112 (*)     POCT Lactate, Venous 1.9      POCT Base Excess, Venous 6.2 (*)     POCT HCO3 Calculated, Venous 33.0 (*)     POCT Hemoglobin, Venous 11.9 (*)     POCT Anion Gap, Venous 6.0 (*)     Patient Temperature        FiO2 98     URINALYSIS WITH REFLEX CULTURE AND MICROSCOPIC - Abnormal    Color, Urine Colorless (*)     Appearance, Urine Clear      Specific Gravity, Urine 1.003 (*)     pH, Urine 5.5      Protein, Urine NEGATIVE      Glucose, Urine 150 (2+) (*)     Blood, Urine NEGATIVE      Ketones, Urine NEGATIVE      Bilirubin, Urine NEGATIVE      Urobilinogen, Urine Normal       Nitrite, Urine NEGATIVE      Leukocyte Esterase, Urine NEGATIVE     POCT GLUCOSE - Abnormal    POCT Glucose 206 (*)    POCT GLUCOSE - Abnormal    POCT Glucose 158 (*)    POCT GLUCOSE - Abnormal    POCT Glucose 117 (*)    POCT GLUCOSE - Abnormal    POCT Glucose 71 (*)    POCT GLUCOSE - Abnormal    POCT Glucose 162 (*)    POCT GLUCOSE - Abnormal    POCT Glucose 209 (*)    LACTATE - Normal    Lactate 1.8      Narrative:     Venipuncture immediately after or during the administration of Metamizole may lead to falsely low results. Testing should be performed immediately prior to Metamizole dosing.   TROPONIN I, HIGH SENSITIVITY - Normal    Troponin I, High Sensitivity 4      Narrative:     Less than 99th percentile of normal range cutoff-  Female and children under 18 years old <14 ng/L; Male <21 ng/L: Negative  Repeat testing should be performed if clinically indicated.     Female and children under 18 years old 14-50 ng/L; Male 21-50 ng/L:  Consistent with possible cardiac damage and possible increased clinical   risk. Serial measurements may help to assess extent of myocardial damage.     >50 ng/L: Consistent with cardiac damage, increased clinical risk and  myocardial infarction. Serial measurements may help assess extent of   myocardial damage.      NOTE: Children less than 1 year old may have higher baseline troponin   levels and results should be interpreted in conjunction with the overall   clinical context.     NOTE: Troponin I testing is performed using a different   testing methodology at [facility] than at other   [facility]. Direct result comparisons should only   be made within the same method.   URINALYSIS WITH REFLEX CULTURE AND MICROSCOPIC    Narrative:     The following orders were created for panel order Urinalysis with Reflex Culture and Microscopic.  Procedure                               Abnormality         Status                     ---------                                -----------         ------                     Urinalysis with Reflex C...[[id number]]  Abnormal            Final result               Extra Urine Gray Tube[[id number]]                            In process                   Please view results for these tests on the individual orders.   EXTRA URINE GRAY TUBE        No orders to display              No data recorded     Irais Coma Scale Score: 15 (10/11/24 1635 : Shankar Raymond RN)                   Medical Decision Making  CBC unremarkable, metabolic panel with glucose of 111    EKG at 1645 with ventricular rate of 83, as read by me, for concerns rhythm with normal axis unremarkable, unremarkable ST and T wave, no evidence of acute ischemia acute events.    Patient had given himself insulin prior to arrival in the department, glucose did downtrend to 71, then increased again after the patient had some food.  Ultimately the patient shows no signs of DKA or other concerning findings on exam.  Will be discharged back to his facility.  Discussed follow-up with primary care, return with any worsening symptoms or additional concerns.    Procedure  Procedures     ELENI Cleaning  10/11/24 2053       ELENI Cleaning  10/11/24 2222

## 2024-10-28 ENCOUNTER — HOSPITAL ENCOUNTER (OUTPATIENT)
Dept: RADIOLOGY | Facility: CLINIC | Age: 69
Discharge: HOME | End: 2024-10-28
Payer: MEDICARE

## 2024-10-28 ENCOUNTER — OFFICE VISIT (OUTPATIENT)
Dept: ORTHOPEDIC SURGERY | Facility: CLINIC | Age: 69
End: 2024-10-28
Payer: MEDICARE

## 2024-10-28 DIAGNOSIS — S82.401S TIBIA AND FIBULA OPEN FRACTURE, RIGHT, SEQUELA: ICD-10-CM

## 2024-10-28 DIAGNOSIS — S82.201S TIBIA AND FIBULA OPEN FRACTURE, RIGHT, SEQUELA: ICD-10-CM

## 2024-10-28 PROCEDURE — 73590 X-RAY EXAM OF LOWER LEG: CPT | Mod: RT

## 2024-10-28 PROCEDURE — 73590 X-RAY EXAM OF LOWER LEG: CPT | Mod: RIGHT SIDE | Performed by: ORTHOPAEDIC SURGERY

## 2024-10-28 PROCEDURE — 99211 OFF/OP EST MAY X REQ PHY/QHP: CPT | Performed by: ORTHOPAEDIC SURGERY

## 2024-10-28 PROCEDURE — 4010F ACE/ARB THERAPY RXD/TAKEN: CPT | Performed by: ORTHOPAEDIC SURGERY

## 2024-10-28 PROCEDURE — 3052F HG A1C>EQUAL 8.0%<EQUAL 9.0%: CPT | Performed by: ORTHOPAEDIC SURGERY

## 2024-10-28 PROCEDURE — 99024 POSTOP FOLLOW-UP VISIT: CPT | Performed by: ORTHOPAEDIC SURGERY

## 2024-10-28 PROCEDURE — 1111F DSCHRG MED/CURRENT MED MERGE: CPT | Performed by: ORTHOPAEDIC SURGERY

## 2024-11-27 NOTE — PROGRESS NOTES
History: Jorge is here for his right leg.  He is 10 weeks out from a tibial nailing.  His pain is improving.  Overall he is doing very well.  He finished out his formal home therapy.    Physical Exam: Wounds are healing nicely.  He has full knee range of motion.  He has some slight tightness with ankle motion but no pain.  Skin is clear.  No numbness or tingling.    Radiographs: 2 view tibia x-rays show a stable distal tibia fracture with hardware stable.    Assessment: Stable right distal tibia fracture status post IM nailing, 10 weeks out    Plan: He is doing well and can begin to gradually wean from the boot to his lace up brace.  He can continue to ice and work on his range of motion exercises.  I would recommend he get into some outpatient therapy to work on strengthening.  He was given an order for this today.  Will see him back for final recheck in 5 to 6 weeks with 2 view tibia x-rays.  If doing well at that point he can be released for activity.  All questions were answered the patient.

## 2024-12-02 ENCOUNTER — OFFICE VISIT (OUTPATIENT)
Dept: ORTHOPEDIC SURGERY | Facility: CLINIC | Age: 69
End: 2024-12-02
Payer: MEDICARE

## 2024-12-02 ENCOUNTER — HOSPITAL ENCOUNTER (OUTPATIENT)
Dept: RADIOLOGY | Facility: CLINIC | Age: 69
Discharge: HOME | End: 2024-12-02
Payer: MEDICARE

## 2024-12-02 DIAGNOSIS — S82.401S TIBIA AND FIBULA OPEN FRACTURE, RIGHT, SEQUELA: ICD-10-CM

## 2024-12-02 DIAGNOSIS — S82.201S TIBIA AND FIBULA OPEN FRACTURE, RIGHT, SEQUELA: ICD-10-CM

## 2024-12-02 PROCEDURE — 4010F ACE/ARB THERAPY RXD/TAKEN: CPT | Performed by: ORTHOPAEDIC SURGERY

## 2024-12-02 PROCEDURE — 73590 X-RAY EXAM OF LOWER LEG: CPT | Mod: RT

## 2024-12-02 PROCEDURE — 3052F HG A1C>EQUAL 8.0%<EQUAL 9.0%: CPT | Performed by: ORTHOPAEDIC SURGERY

## 2024-12-02 PROCEDURE — L1902 AFO ANKLE GAUNTLET PRE OTS: HCPCS | Performed by: ORTHOPAEDIC SURGERY

## 2024-12-02 PROCEDURE — 1159F MED LIST DOCD IN RCRD: CPT | Performed by: ORTHOPAEDIC SURGERY

## 2024-12-02 PROCEDURE — 73590 X-RAY EXAM OF LOWER LEG: CPT | Mod: RIGHT SIDE | Performed by: ORTHOPAEDIC SURGERY

## 2024-12-02 PROCEDURE — 99211 OFF/OP EST MAY X REQ PHY/QHP: CPT | Performed by: ORTHOPAEDIC SURGERY

## 2024-12-02 PROCEDURE — 1036F TOBACCO NON-USER: CPT | Performed by: ORTHOPAEDIC SURGERY

## 2024-12-05 ENCOUNTER — APPOINTMENT (OUTPATIENT)
Dept: PHYSICAL THERAPY | Facility: CLINIC | Age: 69
End: 2024-12-05
Payer: MEDICARE

## 2024-12-06 ENCOUNTER — APPOINTMENT (OUTPATIENT)
Dept: PHYSICAL THERAPY | Facility: CLINIC | Age: 69
End: 2024-12-06
Payer: MEDICARE

## 2025-01-12 NOTE — PROGRESS NOTES
History: Jorge is here for his right  leg . He is 3 months out from a tibial nailing. Overall his leg is doing well.  He has been weightbearing and feels his pain is improving    Past medical history: Multiple  Medications: Multiple  Allergies: No known drug allergies    Please refer to the intake H&P regarding the patient's review of systems, family history and social history as was done today    HEENT: Normal  Lungs: Clear to auscultation  Heart: RRR  Abdomen: Soft, nontender  Skin: clear  Extremity: Wounds are clear.  He has excellent range of motion the knee and ankle.  No tenderness with gentle palpation.  No numbness or tingling.  Contralateral exam is normal for strength, motion, stability and neurovascular assessment.    Radiographs: 2 view tibia x-rays show a stable distal tibia fracture with stable appearance of hardware.     Assessment: Stable right distal tibia fracture status post IM nailing, 3 months out     Plan: Overall his leg is doing well. He can continue weight bear as tolerated and return to full activities. He can follow up as needed.  He would benefit from some continuing strengthening in the leg.  All questions were answered today with the patient.    Scribe Attestation  By signing my name below, Susi ANDERSON Scribe   attest that this documentation has been prepared under the direction and in the presence of Sarthak Ramos MD.

## 2025-01-13 ENCOUNTER — HOSPITAL ENCOUNTER (OUTPATIENT)
Dept: RADIOLOGY | Facility: CLINIC | Age: 70
Discharge: HOME | End: 2025-01-13
Payer: MEDICARE

## 2025-01-13 ENCOUNTER — OFFICE VISIT (OUTPATIENT)
Dept: ORTHOPEDIC SURGERY | Facility: CLINIC | Age: 70
End: 2025-01-13
Payer: MEDICARE

## 2025-01-13 DIAGNOSIS — S82.401S TIBIA AND FIBULA OPEN FRACTURE, RIGHT, SEQUELA: ICD-10-CM

## 2025-01-13 DIAGNOSIS — S82.201S TIBIA AND FIBULA OPEN FRACTURE, RIGHT, SEQUELA: ICD-10-CM

## 2025-01-13 PROCEDURE — 4010F ACE/ARB THERAPY RXD/TAKEN: CPT | Performed by: ORTHOPAEDIC SURGERY

## 2025-01-13 PROCEDURE — 99213 OFFICE O/P EST LOW 20 MIN: CPT | Performed by: ORTHOPAEDIC SURGERY

## 2025-01-13 PROCEDURE — 73590 X-RAY EXAM OF LOWER LEG: CPT | Mod: RT

## 2025-01-13 PROCEDURE — 73590 X-RAY EXAM OF LOWER LEG: CPT | Mod: RIGHT SIDE | Performed by: ORTHOPAEDIC SURGERY

## 2025-02-20 RX ORDER — MONTELUKAST SODIUM 10 MG/1
10 TABLET ORAL NIGHTLY
Qty: 30 TABLET | Refills: 10 | OUTPATIENT
Start: 2025-02-20

## 2025-03-30 ENCOUNTER — INPATIENT HOSPITAL (OUTPATIENT)
Dept: URBAN - METROPOLITAN AREA HOSPITAL 50 | Facility: HOSPITAL | Age: 70
End: 2025-03-30
Payer: MEDICARE

## 2025-03-30 DIAGNOSIS — R74.8 ABNORMAL LEVELS OF OTHER SERUM ENZYMES: ICD-10-CM

## 2025-03-30 DIAGNOSIS — K22.10 ULCER OF ESOPHAGUS WITHOUT BLEEDING: ICD-10-CM

## 2025-03-30 DIAGNOSIS — D64.9 ANEMIA, UNSPECIFIED: ICD-10-CM

## 2025-03-30 DIAGNOSIS — K29.40 CHRONIC ATROPHIC GASTRITIS WITHOUT BLEEDING: ICD-10-CM

## 2025-03-30 DIAGNOSIS — K44.9 DIAPHRAGMATIC HERNIA WITHOUT OBSTRUCTION OR GANGRENE: ICD-10-CM

## 2025-03-30 DIAGNOSIS — R13.10 DYSPHAGIA, UNSPECIFIED: ICD-10-CM

## 2025-03-30 DIAGNOSIS — K29.80 DUODENITIS WITHOUT BLEEDING: ICD-10-CM

## 2025-03-30 PROCEDURE — 99222 1ST HOSP IP/OBS MODERATE 55: CPT | Performed by: INTERNAL MEDICINE

## 2025-03-31 PROCEDURE — 43239 EGD BIOPSY SINGLE/MULTIPLE: CPT | Performed by: INTERNAL MEDICINE

## 2025-04-01 ENCOUNTER — INPATIENT HOSPITAL (OUTPATIENT)
Dept: URBAN - METROPOLITAN AREA HOSPITAL 50 | Facility: HOSPITAL | Age: 70
End: 2025-04-01
Payer: MEDICARE

## 2025-04-01 DIAGNOSIS — K29.40 CHRONIC ATROPHIC GASTRITIS WITHOUT BLEEDING: ICD-10-CM

## 2025-04-01 DIAGNOSIS — D64.9 ANEMIA, UNSPECIFIED: ICD-10-CM

## 2025-04-01 DIAGNOSIS — K22.10 ULCER OF ESOPHAGUS WITHOUT BLEEDING: ICD-10-CM

## 2025-04-01 PROCEDURE — 99233 SBSQ HOSP IP/OBS HIGH 50: CPT | Mod: FS | Performed by: NURSE PRACTITIONER

## 2025-06-16 ENCOUNTER — APPOINTMENT (OUTPATIENT)
Dept: GASTROENTEROLOGY | Facility: CLINIC | Age: 70
End: 2025-06-16
Payer: MEDICARE

## (undated) DEVICE — PREP, IODOPHOR, W/ALCOHOL, DURAPREP, W/APPLICATOR, 26 CC

## (undated) DEVICE — DRILL BIT, 4.2MM 3-FLUTED QC 145MM

## (undated) DEVICE — GUIDEWIRE, 3.2 X 400

## (undated) DEVICE — CAUTERY, PENCIL, PUSH BUTTON, SMOKE EVAC, 70MM

## (undated) DEVICE — DRAPE, C-ARMOR KIT

## (undated) DEVICE — PADDING, CAST, SPECIALIST, 6 IN X 4 YD, STERILE

## (undated) DEVICE — GLOVE, SURGICAL, PROTEXIS PI , 7.5, PF, LF

## (undated) DEVICE — Device

## (undated) DEVICE — DRILL BIT, CALIBRATED, 4.2MM, EXTRA LONG

## (undated) DEVICE — GLOVE, SURGICAL, PROTEXIS PI , 7.0, PF, LF

## (undated) DEVICE — DRAPE, SHEET, EXTREMITY, W/ARM BOARD COVERS, 87 X 106 X 128 IN, DISPOSABLE, LF, STERILE

## (undated) DEVICE — BANDAGE, ELASTIC, 6 X 10YD, BEIGE, LF

## (undated) DEVICE — DRAPE, SHEET, U, STERI DRAPE, 47 X 51 IN, DISPOSABLE, STERILE

## (undated) DEVICE — ROD, REAMING WITH STRAIGHT BALL TIP, 3.0 X 950MM, STERILE

## (undated) DEVICE — BANDAGE, COFLEX, 6 X 5 YDS, FOAM TAN, STERILE, LF

## (undated) DEVICE — SOLUTION, INJECTION, USP, SODIUM CHLORIDE 0.9%, .9, NACL, 1000 ML, BAG

## (undated) DEVICE — BATH BLANKET STERILE

## (undated) DEVICE — STRAP, VELCRO, BODY, 4 X 60IN, NS

## (undated) DEVICE — ADHESIVE, SKIN, DERMABOND ADVANCED, 15CM, PEN-STYLE

## (undated) DEVICE — DRAPE, SHEET, THREE QUARTER, FAN FOLD, 57 X 77 IN

## (undated) DEVICE — TOWEL PACK, STERILE, 16X24, XRAY DETECTABLE, BLUE, 4/PK

## (undated) DEVICE — DRAPE, SHEET, U, W/ADHESIVE STRIP, IMPERVIOUS, 60 X 70 IN, DISPOSABLE, LF, STERILE

## (undated) DEVICE — MANIFOLD, 4 PORT NEPTUNE STANDARD

## (undated) DEVICE — SUTURE, VICRYL, 1, 36 IN, V-34, VIOLET

## (undated) DEVICE — STAPLER, SKIN, PLUS, WIDE, 35

## (undated) DEVICE — TIP, SUCTION, YANKAUER, FLEXIBLE

## (undated) DEVICE — DRAPE, SHEET, XL

## (undated) DEVICE — BLADE, GEN COATED 2.75, LF

## (undated) DEVICE — BOWL, BASIN, 32 OZ, STERILE

## (undated) DEVICE — DRAPE, INSTRUMENT, W/POUCH, STERI DRAPE, 7 X 11 IN, DISPOSABLE, STERILE

## (undated) DEVICE — SUTURE, VICRYL 0, TAPER POINT, CT-1 VIOLET 27 INCH

## (undated) DEVICE — DRESSING, GAUZE, PETROLATUM, STRIP, XEROFORM, 1 X 8 IN, STERILE

## (undated) DEVICE — BANDAGE, ESMARK, 6 IN X 9 FT, STERILE

## (undated) DEVICE — GLOVE, SURGICAL, PROTEXIS PI BLUE W/NEUTHERA, 7.0, PF, LF

## (undated) DEVICE — DRAPE, C-ARM IMAGE

## (undated) DEVICE — STRAP, ARM BOARD, 32 X 1.5